# Patient Record
Sex: MALE | Race: WHITE | Employment: OTHER | URBAN - NONMETROPOLITAN AREA
[De-identification: names, ages, dates, MRNs, and addresses within clinical notes are randomized per-mention and may not be internally consistent; named-entity substitution may affect disease eponyms.]

---

## 2020-12-28 DIAGNOSIS — E78.49 OTHER HYPERLIPIDEMIA: ICD-10-CM

## 2020-12-28 DIAGNOSIS — I10 ESSENTIAL HYPERTENSION: Primary | ICD-10-CM

## 2020-12-30 PROBLEM — I10 ESSENTIAL HYPERTENSION: Status: ACTIVE | Noted: 2020-12-30

## 2020-12-30 PROBLEM — E78.5 HYPERLIPIDEMIA: Status: ACTIVE | Noted: 2020-12-30

## 2020-12-30 RX ORDER — LISINOPRIL AND HYDROCHLOROTHIAZIDE 12.5; 2 MG/1; MG/1
TABLET ORAL
Qty: 180 TAB | Refills: 3 | Status: SHIPPED | OUTPATIENT
Start: 2020-12-30 | End: 2022-01-01 | Stop reason: SDUPTHER

## 2020-12-30 RX ORDER — PRAVASTATIN SODIUM 20 MG/1
TABLET ORAL
Qty: 90 TAB | Refills: 3 | Status: SHIPPED | OUTPATIENT
Start: 2020-12-30 | End: 2022-01-01 | Stop reason: SDUPTHER

## 2020-12-30 RX ORDER — AMLODIPINE BESYLATE 10 MG/1
TABLET ORAL
Qty: 90 TAB | Refills: 3 | Status: SHIPPED | OUTPATIENT
Start: 2020-12-30 | End: 2022-01-01 | Stop reason: SDUPTHER

## 2020-12-31 ENCOUNTER — OFFICE VISIT (OUTPATIENT)
Dept: FAMILY MEDICINE CLINIC | Age: 80
End: 2020-12-31
Payer: MEDICARE

## 2020-12-31 ENCOUNTER — HOSPITAL ENCOUNTER (OUTPATIENT)
Dept: LAB | Age: 80
Discharge: HOME | End: 2020-12-31
Payer: MEDICARE

## 2020-12-31 VITALS
DIASTOLIC BLOOD PRESSURE: 67 MMHG | SYSTOLIC BLOOD PRESSURE: 146 MMHG | HEIGHT: 67 IN | WEIGHT: 188 LBS | TEMPERATURE: 97.6 F | HEART RATE: 76 BPM | BODY MASS INDEX: 29.51 KG/M2 | OXYGEN SATURATION: 97 %

## 2020-12-31 DIAGNOSIS — I73.9 PERIPHERAL VASCULAR DISEASE (HCC): ICD-10-CM

## 2020-12-31 DIAGNOSIS — N40.1 BENIGN PROSTATIC HYPERPLASIA WITH LOWER URINARY TRACT SYMPTOMS, SYMPTOM DETAILS UNSPECIFIED: ICD-10-CM

## 2020-12-31 DIAGNOSIS — E78.49 OTHER HYPERLIPIDEMIA: ICD-10-CM

## 2020-12-31 DIAGNOSIS — Z12.11 ENCOUNTER FOR SCREENING COLONOSCOPY: ICD-10-CM

## 2020-12-31 DIAGNOSIS — R26.9 ABNORMAL GAIT: ICD-10-CM

## 2020-12-31 DIAGNOSIS — I10 ESSENTIAL HYPERTENSION: Primary | ICD-10-CM

## 2020-12-31 LAB
ALBUMIN SERPL-MCNC: 4.4 G/DL (ref 3.5–4.7)
ALBUMIN/GLOB SERPL: 1.5 {RATIO}
ALP SERPL-CCNC: 81 U/L (ref 38–126)
ALT SERPL-CCNC: 38 U/L (ref 3–72)
ANION GAP SERPL CALC-SCNC: 10 MMOL/L
AST SERPL W P-5'-P-CCNC: 29 U/L (ref 17–74)
BILIRUB SERPL-MCNC: 0.6 MG/DL (ref 0.2–1)
BUN SERPL-MCNC: 21 MG/DL (ref 9–21)
BUN/CREAT SERPL: 26
CA-I BLD-MCNC: 9.3 MG/DL (ref 8.5–10.5)
CHLORIDE SERPL-SCNC: 101 MMOL/L (ref 94–111)
CO2 SERPL-SCNC: 28 MMOL/L (ref 21–33)
CREAT SERPL-MCNC: 0.8 MG/DL (ref 0.8–1.5)
GLOBULIN SER CALC-MCNC: 3 G/DL
GLUCOSE SERPL-MCNC: 114 MG/DL (ref 70–110)
POTASSIUM SERPL-SCNC: 3.7 MMOL/L (ref 3.2–5.1)
PROT SERPL-MCNC: 7.4 G/DL (ref 6.1–8.4)
SODIUM SERPL-SCNC: 139 MMOL/L (ref 135–145)

## 2020-12-31 PROCEDURE — 80061 LIPID PANEL: CPT

## 2020-12-31 PROCEDURE — 80053 COMPREHEN METABOLIC PANEL: CPT

## 2020-12-31 PROCEDURE — 99214 OFFICE O/P EST MOD 30 MIN: CPT | Performed by: FAMILY MEDICINE

## 2020-12-31 PROCEDURE — 36415 COLL VENOUS BLD VENIPUNCTURE: CPT

## 2020-12-31 PROCEDURE — 84153 ASSAY OF PSA TOTAL: CPT

## 2020-12-31 NOTE — PROGRESS NOTES
Subjective:   Marisel Marr is a 79 y.o. male who was seen for Medication Evaluation    HPI patient is an 79-year-old male have not seen in over a year. He needs medication refills he lives with his wife. He has some gait issues and her memory is that we referred him for evaluation he has had a procedure to the artery in his thigh the vascular surgeon has evaluated carotids as well. The procedure to his thigh was a year ago he is going to set up a follow-up he has not had any discomfort he is not a smoker at this point. He has had no falls or injuries no rashes no syncope or loss of consciousness. He has not been doing as much exercise he says his gait is a little abnormal and we actually send him to physical therapy for evaluation and they told him he was fine no chest pain no shortness of breath he has hypertension hyperlipidemia. He had a colonoscopy 2020 and was told it was normal and he does not need any further. Bowel movements have been a little loose but not severely. No chest congestion or cough. He and his wife are finishing a masters program for their granddaughter who has been living with them for years. There is a little anxiety associated with that finance. No chest pain or shortness of breath. No rash no syncope or loss of consciousness. Home Medications    Medication Sig Start Date End Date Taking?  Authorizing Provider   lisinopril-hydroCHLOROthiazide (PRINZIDE, ZESTORETIC) 20-12.5 mg per tablet TAKE 2 TABLETS DAILY 20  Yes Moraima Sancehz MD   pravastatin (PRAVACHOL) 20 mg tablet TAKE 1 TABLET DAILY 20  Yes Moraima Sanchez MD   amLODIPine (NORVASC) 10 mg tablet TAKE 1 TABLET DAILY 20  Yes Moraima Sanchez MD      Allergies   Allergen Reactions    Pollen Extracts Runny Nose     Social History     Tobacco Use    Smoking status: Former Smoker     Packs/day: 1.00     Years: 2.00     Pack years: 2.00     Quit date: 1960     Years since quittin.0  Smokeless tobacco: Never Used   Substance Use Topics    Alcohol use: Not on file    Drug use: Never            Review of Systems   Constitutional: Negative. HENT: Negative. Eyes: Negative. Respiratory: Negative. Cardiovascular: Negative. Endocrine: Negative. Genitourinary: Negative. Musculoskeletal: Negative. Neurological:        Questionable gait abnormality   Hematological: Negative. Psychiatric/Behavioral: Negative. Physical Exam   Objective:     Visit Vitals  BP (!) 146/67 (BP 1 Location: Left arm, BP Patient Position: Sitting)   Pulse 76   Temp 97.6 °F (36.4 °C)   Ht 5' 7\" (1.702 m)   Wt 188 lb (85.3 kg)   SpO2 97%   BMI 29.44 kg/m²      General: alert, cooperative, no distress   Mental  status: normal mood, behavior, speech, dress, motor activity, and thought processes, able to follow commands   HENT: NCAT   Neck: no visualized mass   Resp: no respiratory distress   Neuro: no gross deficits   Skin: no discoloration or lesions of concern on visible areas   Psychiatric: normal affect, consistent with stated mood, no evidence of hallucinations   The repeat blood pressure is 130/80 pulse is 84 respirations 16 he is pleasant and cooperative. The lungs are clear the abdomen is soft the extremities are clear no edema his pulses seem to be appropriate. His prostate was examined it is significantly enlarged. Nodules were appreciated  Assessment & Plan:     Severe prostate enlargement screening colonoscopy up-to-date peripheral vascular disease will need follow-up hyperlipidemia and hypertension abnormal gait going to get a complete medical panel. I am going to get a lipid panel as well.   I am going to check his PSA with the size of his prostate we are going to get another CAT scan of his head due to gait issues which he is insistent is worsening we may need neurological evaluation but physical therapy said his gait is normal he will need to follow-up for peripheral vascular issues. He seems to be medically stable at this time. We will follow him back in 6 months or sooner if needed this was a 45-minute evaluation greater than half the time was spent in consultation        35 20 43    Additional exam findings: We discussed the expected course, resolution and complications of the diagnosis(es) in detail. Medication risks, benefits, costs, interactions, and alternatives were discussed as indicated. I advised him to contact the office if his condition worsens, changes or fails to improve as anticipated. He expressed understanding with the diagnosis(es) and plan.

## 2020-12-31 NOTE — PROGRESS NOTES
Geremias Abernathy presents today for   Chief Complaint   Patient presents with    Medication Evaluation       Is someone accompanying this pt? no    Is the patient using any DME equipment during OV? no    Depression Screening:  3 most recent PHQ Screens 12/31/2020   PHQ Not Done Medical Reason (indicate in comments)   Little interest or pleasure in doing things Not at all   Feeling down, depressed, irritable, or hopeless Not at all   Total Score PHQ 2 0       Learning Assessment:  No flowsheet data found. Abuse Screening:  No flowsheet data found. Fall Risk  Fall Risk Assessment, last 12 mths 12/31/2020   Able to walk? Yes   Fall in past 12 months? 0   Do you feel unsteady? 1   Are you worried about falling 1   Is the gait abnormal? 1   Number of falls in past 12 months 0       ADL  No flowsheet data found. Health Maintenance reviewed and discussed and ordered per Provider. Health Maintenance Due   Topic Date Due    Lipid Screen  03/02/1950    DTaP/Tdap/Td series (1 - Tdap) 03/02/1960    Shingrix Vaccine Age 50> (1 of 2) 03/02/1989    GLAUCOMA SCREENING Q2Y  03/02/2004    Pneumococcal 65+ years (1 of 1 - PPSV23) 03/02/2004    Flu Vaccine (1) 09/01/2020   . Coordination of Care:  1. Have you been to the ER, urgent care clinic since your last visit? Hospitalized since your last visit? no    2. Have you seen or consulted any other health care providers outside of the [address] [place] [address] since your last visit? Include any pap smears or colon screening.  no    Providers orders his own labs, orders for colonoscopy, mammograms and referrals as needed    Last UDS Checked no  Last Pain contract signed: no

## 2021-01-01 LAB
CHOLEST SERPL-MCNC: 202 MG/DL
HDLC SERPL-MCNC: 44 MG/DL
HDLC SERPL: 4.6 {RATIO} (ref 0–5)
LDLC SERPL CALC-MCNC: 87.2 MG/DL (ref 0–100)
LIPID PROFILE,FLP: ABNORMAL
PSA SERPL-MCNC: 0.4 NG/ML (ref 0.01–4)
TRIGL SERPL-MCNC: 354 MG/DL (ref ?–150)
VLDLC SERPL CALC-MCNC: 70.8 MG/DL

## 2021-01-03 ENCOUNTER — TELEPHONE (OUTPATIENT)
Dept: FAMILY MEDICINE CLINIC | Age: 81
End: 2021-01-03

## 2021-01-03 NOTE — TELEPHONE ENCOUNTER
About his lab work I talked with his wife Tran normal triglycerides slightly elevated lipids otherwise normal kidney function renal function normal no change in therapy

## 2021-01-05 ENCOUNTER — HOSPITAL ENCOUNTER (OUTPATIENT)
Dept: CT IMAGING | Age: 81
Discharge: HOME | End: 2021-01-05
Attending: FAMILY MEDICINE
Payer: MEDICARE

## 2021-01-05 DIAGNOSIS — R26.9 ABNORMAL GAIT: ICD-10-CM

## 2021-01-05 PROCEDURE — 70450 CT HEAD/BRAIN W/O DYE: CPT

## 2021-01-08 ENCOUNTER — TELEPHONE (OUTPATIENT)
Dept: FAMILY MEDICINE CLINIC | Age: 81
End: 2021-01-08

## 2022-01-01 ENCOUNTER — OFFICE VISIT (OUTPATIENT)
Dept: FAMILY MEDICINE CLINIC | Age: 82
End: 2022-01-01
Payer: MEDICARE

## 2022-01-01 ENCOUNTER — APPOINTMENT (OUTPATIENT)
Dept: CT IMAGING | Age: 82
DRG: 871 | End: 2022-01-01
Attending: EMERGENCY MEDICINE
Payer: MEDICARE

## 2022-01-01 ENCOUNTER — APPOINTMENT (OUTPATIENT)
Dept: GENERAL RADIOLOGY | Age: 82
End: 2022-01-01
Attending: EMERGENCY MEDICINE
Payer: MEDICARE

## 2022-01-01 ENCOUNTER — APPOINTMENT (OUTPATIENT)
Dept: GENERAL RADIOLOGY | Age: 82
DRG: 871 | End: 2022-01-01
Attending: EMERGENCY MEDICINE
Payer: MEDICARE

## 2022-01-01 ENCOUNTER — APPOINTMENT (OUTPATIENT)
Dept: CT IMAGING | Age: 82
End: 2022-01-01
Attending: EMERGENCY MEDICINE
Payer: MEDICARE

## 2022-01-01 ENCOUNTER — APPOINTMENT (OUTPATIENT)
Dept: GENERAL RADIOLOGY | Age: 82
DRG: 871 | End: 2022-01-01
Attending: STUDENT IN AN ORGANIZED HEALTH CARE EDUCATION/TRAINING PROGRAM
Payer: MEDICARE

## 2022-01-01 ENCOUNTER — HOSPITAL ENCOUNTER (INPATIENT)
Age: 82
LOS: 2 days | DRG: 871 | End: 2022-09-02
Attending: EMERGENCY MEDICINE | Admitting: STUDENT IN AN ORGANIZED HEALTH CARE EDUCATION/TRAINING PROGRAM
Payer: MEDICARE

## 2022-01-01 ENCOUNTER — HOSPITAL ENCOUNTER (EMERGENCY)
Age: 82
Discharge: ANOTHER HOSPITAL | End: 2022-08-09
Attending: EMERGENCY MEDICINE
Payer: MEDICARE

## 2022-01-01 ENCOUNTER — PATIENT OUTREACH (OUTPATIENT)
Dept: CASE MANAGEMENT | Age: 82
End: 2022-01-01

## 2022-01-01 ENCOUNTER — HOSPITAL ENCOUNTER (OUTPATIENT)
Dept: LAB | Age: 82
Discharge: HOME | End: 2022-01-17
Payer: MEDICARE

## 2022-01-01 ENCOUNTER — APPOINTMENT (OUTPATIENT)
Dept: NON INVASIVE DIAGNOSTICS | Age: 82
DRG: 871 | End: 2022-01-01
Attending: STUDENT IN AN ORGANIZED HEALTH CARE EDUCATION/TRAINING PROGRAM
Payer: MEDICARE

## 2022-01-01 VITALS
TEMPERATURE: 99.4 F | HEIGHT: 67 IN | SYSTOLIC BLOOD PRESSURE: 172 MMHG | OXYGEN SATURATION: 95 % | DIASTOLIC BLOOD PRESSURE: 55 MMHG | BODY MASS INDEX: 26.79 KG/M2 | RESPIRATION RATE: 26 BRPM | HEART RATE: 126 BPM | WEIGHT: 170.7 LBS

## 2022-01-01 VITALS
HEART RATE: 79 BPM | OXYGEN SATURATION: 98 % | BODY MASS INDEX: 29.35 KG/M2 | TEMPERATURE: 98.4 F | WEIGHT: 187 LBS | SYSTOLIC BLOOD PRESSURE: 161 MMHG | DIASTOLIC BLOOD PRESSURE: 54 MMHG | HEIGHT: 67 IN

## 2022-01-01 VITALS
DIASTOLIC BLOOD PRESSURE: 89 MMHG | OXYGEN SATURATION: 100 % | TEMPERATURE: 98.4 F | HEART RATE: 92 BPM | WEIGHT: 185.45 LBS | BODY MASS INDEX: 29.11 KG/M2 | SYSTOLIC BLOOD PRESSURE: 149 MMHG | RESPIRATION RATE: 21 BRPM | HEIGHT: 67 IN

## 2022-01-01 DIAGNOSIS — R41.82 ALTERED MENTAL STATUS, UNSPECIFIED ALTERED MENTAL STATUS TYPE: Primary | ICD-10-CM

## 2022-01-01 DIAGNOSIS — Z13.31 SCREENING FOR DEPRESSION: ICD-10-CM

## 2022-01-01 DIAGNOSIS — N39.0 URINARY TRACT INFECTION WITHOUT HEMATURIA, SITE UNSPECIFIED: ICD-10-CM

## 2022-01-01 DIAGNOSIS — Z13.39 SCREENING FOR ALCOHOLISM: ICD-10-CM

## 2022-01-01 DIAGNOSIS — Z00.00 MEDICARE ANNUAL WELLNESS VISIT, SUBSEQUENT: ICD-10-CM

## 2022-01-01 DIAGNOSIS — R47.01 APHASIA: Primary | ICD-10-CM

## 2022-01-01 DIAGNOSIS — I10 ESSENTIAL HYPERTENSION: ICD-10-CM

## 2022-01-01 DIAGNOSIS — E78.49 OTHER HYPERLIPIDEMIA: ICD-10-CM

## 2022-01-01 DIAGNOSIS — G31.9 CEREBRAL ATROPHY (HCC): Primary | ICD-10-CM

## 2022-01-01 DIAGNOSIS — R26.9 ABNORMAL GAIT: ICD-10-CM

## 2022-01-01 DIAGNOSIS — N17.9 AKI (ACUTE KIDNEY INJURY) (HCC): ICD-10-CM

## 2022-01-01 DIAGNOSIS — U07.1 COVID: ICD-10-CM

## 2022-01-01 DIAGNOSIS — G40.409 GENERALIZED TONIC-CLONIC SEIZURE (HCC): ICD-10-CM

## 2022-01-01 DIAGNOSIS — R26.0 ATAXIC GAIT: ICD-10-CM

## 2022-01-01 DIAGNOSIS — I73.9 PERIPHERAL VASCULAR DISEASE (HCC): ICD-10-CM

## 2022-01-01 LAB
ACC. NO. FROM MICRO ORDER, ACCP: ABNORMAL
ACINETOBACTER CALCOACETICUS-BAUMANII COMPLEX, ACBCX: NOT DETECTED
ALBUMIN SERPL-MCNC: 2.1 G/DL (ref 3.4–5)
ALBUMIN SERPL-MCNC: 2.5 G/DL (ref 3.4–5)
ALBUMIN SERPL-MCNC: 2.7 G/DL (ref 3.4–5)
ALBUMIN SERPL-MCNC: 3.4 G/DL (ref 3.4–5)
ALBUMIN SERPL-MCNC: 4.3 G/DL (ref 3.4–5)
ALBUMIN SERPL-MCNC: 4.3 G/DL (ref 3.5–4.7)
ALBUMIN/GLOB SERPL: 0.7 {RATIO} (ref 0.8–1.7)
ALBUMIN/GLOB SERPL: 0.8 {RATIO} (ref 0.8–1.7)
ALBUMIN/GLOB SERPL: 0.8 {RATIO} (ref 0.8–1.7)
ALBUMIN/GLOB SERPL: 0.9 {RATIO} (ref 0.8–1.7)
ALBUMIN/GLOB SERPL: 1.2 {RATIO} (ref 0.8–1.7)
ALBUMIN/GLOB SERPL: 1.5 {RATIO}
ALP SERPL-CCNC: 59 U/L (ref 45–117)
ALP SERPL-CCNC: 63 U/L (ref 45–117)
ALP SERPL-CCNC: 69 U/L (ref 38–126)
ALP SERPL-CCNC: 70 U/L (ref 45–117)
ALP SERPL-CCNC: 89 U/L (ref 45–117)
ALP SERPL-CCNC: 92 U/L (ref 45–117)
ALT SERPL-CCNC: 33 U/L (ref 3–72)
ALT SERPL-CCNC: 38 U/L (ref 16–61)
ALT SERPL-CCNC: 52 U/L (ref 16–61)
ALT SERPL-CCNC: 55 U/L (ref 16–61)
ALT SERPL-CCNC: 58 U/L (ref 16–61)
ALT SERPL-CCNC: 62 U/L (ref 16–61)
AMMONIA PLAS-SCNC: 25 UMOL/L (ref 11–32)
AMPHET UR QL SCN: NEGATIVE
ANION GAP SERPL CALC-SCNC: 10 MMOL/L (ref 3–18)
ANION GAP SERPL CALC-SCNC: 11 MMOL/L
ANION GAP SERPL CALC-SCNC: 14 MMOL/L (ref 3–18)
ANION GAP SERPL CALC-SCNC: 6 MMOL/L (ref 3–18)
ANION GAP SERPL CALC-SCNC: 6 MMOL/L (ref 3–18)
ANION GAP SERPL CALC-SCNC: 9 MMOL/L (ref 3–18)
APPEARANCE UR: CLEAR
APPEARANCE UR: CLEAR
APTT PPP: 106.6 SEC (ref 23–36.4)
APTT PPP: 167.2 SEC (ref 23–36.4)
APTT PPP: 26 SEC (ref 23–36.4)
APTT PPP: 30.5 SEC (ref 23–36.4)
APTT PPP: 70.4 SEC (ref 23–36.4)
APTT PPP: 81.1 SEC (ref 23–36.4)
ARTERIAL PATENCY WRIST A: YES
AST SERPL W P-5'-P-CCNC: 27 U/L (ref 10–38)
AST SERPL W P-5'-P-CCNC: 31 U/L (ref 17–74)
AST SERPL W P-5'-P-CCNC: 58 U/L (ref 10–38)
AST SERPL W P-5'-P-CCNC: 66 U/L (ref 10–38)
AST SERPL W P-5'-P-CCNC: 77 U/L (ref 10–38)
AST SERPL W P-5'-P-CCNC: 91 U/L (ref 10–38)
ATRIAL RATE: 183 BPM
ATRIAL RATE: 96 BPM
BACTERIA SPEC CULT: NORMAL
BACTERIA URNS QL MICRO: ABNORMAL /HPF
BACTERIA URNS QL MICRO: ABNORMAL /HPF
BACTEROIDES FRAGILIS, BFRA: NOT DETECTED
BARBITURATES UR QL SCN: NEGATIVE
BASE DEFICIT BLDA-SCNC: 5.1 MMOL/L
BASOPHILS # BLD: 0 K/UL (ref 0–0.1)
BASOPHILS # BLD: 0.1 K/UL (ref 0–0.1)
BASOPHILS NFR BLD: 0 % (ref 0–2)
BASOPHILS NFR BLD: 1 % (ref 0–2)
BASOPHILS NFR BLD: 1 % (ref 0–2)
BDY SITE: ABNORMAL
BENZODIAZ UR QL: POSITIVE
BILIRUB SERPL-MCNC: 0.6 MG/DL (ref 0.2–1)
BILIRUB SERPL-MCNC: 0.6 MG/DL (ref 0.2–1)
BILIRUB SERPL-MCNC: 0.7 MG/DL (ref 0.2–1)
BILIRUB SERPL-MCNC: 0.9 MG/DL (ref 0.2–1)
BILIRUB SERPL-MCNC: 0.9 MG/DL (ref 0.2–1)
BILIRUB SERPL-MCNC: 1 MG/DL (ref 0.2–1)
BILIRUB UR QL: NEGATIVE
BILIRUB UR QL: NEGATIVE
BIOFIRE COMMENT, BCIDPF: ABNORMAL
BLASTS NFR BLD MANUAL: 0 %
BNP SERPL-MCNC: 128 PG/ML (ref 0–1800)
BUN SERPL-MCNC: 15 MG/DL (ref 7–18)
BUN SERPL-MCNC: 18 MG/DL (ref 9–21)
BUN SERPL-MCNC: 20 MG/DL (ref 7–18)
BUN SERPL-MCNC: 24 MG/DL (ref 7–18)
BUN SERPL-MCNC: 24 MG/DL (ref 7–18)
BUN SERPL-MCNC: 26 MG/DL (ref 7–18)
BUN/CREAT SERPL: 14 (ref 12–20)
BUN/CREAT SERPL: 16 (ref 12–20)
BUN/CREAT SERPL: 18 (ref 12–20)
BUN/CREAT SERPL: 23
BUN/CREAT SERPL: 30 (ref 12–20)
BUN/CREAT SERPL: 31 (ref 12–20)
C GLABRATA DNA VAG QL NAA+PROBE: NOT DETECTED
CA-I BLD-MCNC: 10 MG/DL (ref 8.5–10.1)
CA-I BLD-MCNC: 7.7 MG/DL (ref 8.5–10.1)
CA-I BLD-MCNC: 7.7 MG/DL (ref 8.5–10.1)
CA-I BLD-MCNC: 8.2 MG/DL (ref 8.5–10.1)
CA-I BLD-MCNC: 8.9 MG/DL (ref 8.5–10.1)
CA-I BLD-MCNC: 9.7 MG/DL (ref 8.5–10.5)
CALCULATED P AXIS, ECG09: -37 DEGREES
CALCULATED P AXIS, ECG09: 5 DEGREES
CALCULATED R AXIS, ECG10: -12 DEGREES
CALCULATED R AXIS, ECG10: 10 DEGREES
CALCULATED R AXIS, ECG10: 6 DEGREES
CALCULATED T AXIS, ECG11: 21 DEGREES
CALCULATED T AXIS, ECG11: 28 DEGREES
CALCULATED T AXIS, ECG11: 41 DEGREES
CANDIDA ALBICANS: NOT DETECTED
CANDIDA AURIS, CAAU: NOT DETECTED
CANDIDA KRUSEI, CKRP: NOT DETECTED
CANDIDA PARAPSILOSIS, CPAUP: NOT DETECTED
CANDIDA TROPICALIS, CTROP: NOT DETECTED
CANNABINOIDS UR QL SCN: NEGATIVE
CHLORIDE SERPL-SCNC: 100 MMOL/L (ref 94–111)
CHLORIDE SERPL-SCNC: 104 MMOL/L (ref 100–111)
CHLORIDE SERPL-SCNC: 110 MMOL/L (ref 100–111)
CHLORIDE SERPL-SCNC: 117 MMOL/L (ref 100–111)
CHLORIDE SERPL-SCNC: 122 MMOL/L (ref 100–111)
CHLORIDE SERPL-SCNC: 99 MMOL/L (ref 100–111)
CHOLEST SERPL-MCNC: 189 MG/DL
CO2 SERPL-SCNC: 22 MMOL/L (ref 21–32)
CO2 SERPL-SCNC: 23 MMOL/L (ref 21–32)
CO2 SERPL-SCNC: 23 MMOL/L (ref 21–32)
CO2 SERPL-SCNC: 24 MMOL/L (ref 21–32)
CO2 SERPL-SCNC: 26 MMOL/L (ref 21–32)
CO2 SERPL-SCNC: 26 MMOL/L (ref 21–33)
COCAINE UR QL SCN: NEGATIVE
COHGB MFR BLD: 0.3 % (ref 1–2)
COLOR UR: ABNORMAL
COLOR UR: YELLOW
COVID-19 RAPID TEST, COVR: DETECTED
CREAT SERPL-MCNC: 0.77 MG/DL (ref 0.6–1.3)
CREAT SERPL-MCNC: 0.8 MG/DL (ref 0.6–1.3)
CREAT SERPL-MCNC: 0.8 MG/DL (ref 0.8–1.5)
CREAT SERPL-MCNC: 0.93 MG/DL (ref 0.6–1.3)
CREAT SERPL-MCNC: 1.41 MG/DL (ref 0.6–1.3)
CREAT SERPL-MCNC: 1.42 MG/DL (ref 0.6–1.3)
CRYPTO NEOFORMANS/GATTII, CRYNEG: NOT DETECTED
DIAGNOSIS, 93000: NORMAL
DIFFERENTIAL METHOD BLD: ABNORMAL
DIFFERENTIAL METHOD BLD: NORMAL
DRUG SCRN COMMENT,DRGCM: ABNORMAL
ECHO AO ASC DIAM: 3.3 CM
ECHO AO ASCENDING AORTA INDEX: 1.79 CM/M2
ECHO AO ROOT DIAM: 3.5 CM
ECHO AO ROOT INDEX: 1.9 CM/M2
ECHO LA AREA 4C: 21.3 CM2
ECHO LA DIAMETER INDEX: 2.12 CM/M2
ECHO LA DIAMETER: 3.9 CM
ECHO LA MAJOR AXIS: 5.3 CM
ECHO LA TO AORTIC ROOT RATIO: 1.11
ECHO LV FRACTIONAL SHORTENING: 30 % (ref 28–44)
ECHO LV INTERNAL DIMENSION DIASTOLE INDEX: 2.55 CM/M2
ECHO LV INTERNAL DIMENSION DIASTOLIC: 4.7 CM (ref 4.2–5.9)
ECHO LV INTERNAL DIMENSION SYSTOLIC INDEX: 1.79 CM/M2
ECHO LV INTERNAL DIMENSION SYSTOLIC: 3.3 CM
ECHO LV IVSD: 1.3 CM (ref 0.6–1)
ECHO LV MASS 2D: 224.8 G (ref 88–224)
ECHO LV MASS INDEX 2D: 122.2 G/M2 (ref 49–115)
ECHO LV POSTERIOR WALL DIASTOLIC: 1.2 CM (ref 0.6–1)
ECHO LV RELATIVE WALL THICKNESS RATIO: 0.51
ECHO LVOT AREA: 3.8 CM2
ECHO LVOT DIAM: 2.2 CM
ECHO RV BASAL DIMENSION: 3.4 CM
ECHO RV LONGITUDINAL DIMENSION: 5.9 CM
ECHO RV MID DIMENSION: 2.8 CM
ENTEROBACTER CLOACAE COMPLEX, ECCP: NOT DETECTED
ENTEROBACTERALES SP. , ENBLS: NOT DETECTED
ENTEROCOCCUS FAECALIS, ENFA: NOT DETECTED
ENTEROCOCCUS FAECIUM, ENFAM: NOT DETECTED
EOSINOPHIL # BLD: 0 K/UL (ref 0–0.4)
EOSINOPHIL # BLD: 0.1 K/UL (ref 0–0.4)
EOSINOPHIL NFR BLD: 0 % (ref 0–5)
EOSINOPHIL NFR BLD: 1 % (ref 0–5)
EPITH CASTS URNS QL MICRO: ABNORMAL /LPF (ref 0–20)
EPITH CASTS URNS QL MICRO: ABNORMAL /LPF (ref 0–20)
ERYTHROCYTE [DISTWIDTH] IN BLOOD BY AUTOMATED COUNT: 12.2 % (ref 11.6–14.5)
ERYTHROCYTE [DISTWIDTH] IN BLOOD BY AUTOMATED COUNT: 12.5 % (ref 11.6–14.5)
ERYTHROCYTE [DISTWIDTH] IN BLOOD BY AUTOMATED COUNT: 13.5 % (ref 11.6–14.5)
ERYTHROCYTE [DISTWIDTH] IN BLOOD BY AUTOMATED COUNT: 13.6 % (ref 11.6–14.5)
ERYTHROCYTE [DISTWIDTH] IN BLOOD BY AUTOMATED COUNT: 13.9 % (ref 11.6–14.5)
ERYTHROCYTE [DISTWIDTH] IN BLOOD BY AUTOMATED COUNT: 14.1 % (ref 11.6–14.5)
ESCHERICHIA COLI: NOT DETECTED
ETHANOL SERPL-MCNC: <3 MG/DL (ref 0–3)
FENTANYL UR QL SCN: NEGATIVE
FIO2 ON VENT: 100 %
FOLATE SERPL-MCNC: 19.6 NG/ML (ref 3.1–17.5)
GLOBULIN SER CALC-MCNC: 2.8 G/DL
GLOBULIN SER CALC-MCNC: 3 G/DL (ref 2–4)
GLOBULIN SER CALC-MCNC: 3.1 G/DL (ref 2–4)
GLOBULIN SER CALC-MCNC: 3.3 G/DL (ref 2–4)
GLOBULIN SER CALC-MCNC: 3.5 G/DL (ref 2–4)
GLOBULIN SER CALC-MCNC: 3.9 G/DL (ref 2–4)
GLUCOSE SERPL-MCNC: 106 MG/DL (ref 74–99)
GLUCOSE SERPL-MCNC: 108 MG/DL (ref 74–99)
GLUCOSE SERPL-MCNC: 122 MG/DL (ref 70–110)
GLUCOSE SERPL-MCNC: 122 MG/DL (ref 74–99)
GLUCOSE SERPL-MCNC: 133 MG/DL (ref 74–99)
GLUCOSE SERPL-MCNC: 144 MG/DL (ref 74–99)
GLUCOSE UR STRIP.AUTO-MCNC: NEGATIVE MG/DL
GLUCOSE UR STRIP.AUTO-MCNC: NEGATIVE MG/DL
HAEMOPHILUS INFLUENZAE, HMI: NOT DETECTED
HCO3 BLDA-SCNC: 20 MMOL/L (ref 22–26)
HCT VFR BLD AUTO: 29.3 % (ref 36–48)
HCT VFR BLD AUTO: 32.4 % (ref 36–48)
HCT VFR BLD AUTO: 34.6 % (ref 36–48)
HCT VFR BLD AUTO: 38.1 % (ref 36–48)
HCT VFR BLD AUTO: 41.7 % (ref 36–48)
HCT VFR BLD AUTO: 43.3 % (ref 36–48)
HDLC SERPL-MCNC: 41 MG/DL (ref 40–60)
HDLC SERPL: 4.6 {RATIO} (ref 0–5)
HGB BLD-MCNC: 11.2 G/DL (ref 13–16)
HGB BLD-MCNC: 11.9 G/DL (ref 13–16)
HGB BLD-MCNC: 13 G/DL (ref 13–16)
HGB BLD-MCNC: 15 G/DL (ref 13–16)
HGB BLD-MCNC: 15.1 G/DL (ref 13–16)
HGB BLD-MCNC: 9.8 G/DL (ref 13–16)
HGB UR QL STRIP: ABNORMAL
HGB UR QL STRIP: ABNORMAL
IMM GRANULOCYTES # BLD AUTO: 0 K/UL
IMM GRANULOCYTES # BLD AUTO: 0 K/UL (ref 0–0.04)
IMM GRANULOCYTES # BLD AUTO: 0 K/UL (ref 0–0.04)
IMM GRANULOCYTES NFR BLD AUTO: 0 %
IMM GRANULOCYTES NFR BLD AUTO: 0 % (ref 0–0.5)
IMM GRANULOCYTES NFR BLD AUTO: 0 % (ref 0–0.5)
INR PPP: 1 (ref 0.8–1.2)
KETONES UR QL STRIP.AUTO: 15 MG/DL
KETONES UR QL STRIP.AUTO: NEGATIVE MG/DL
KLEBSIELLA AEROGENES, KLAE: NOT DETECTED
KLEBSIELLA OXYTOCA: NOT DETECTED
KLEBSIELLA PNEUMONIAE GROUP, KPPG: NOT DETECTED
LACTATE SERPL-SCNC: 2.1 MMOL/L (ref 0.4–2)
LACTATE SERPL-SCNC: 3 MMOL/L (ref 0.4–2)
LACTATE SERPL-SCNC: 3.7 MMOL/L (ref 0.4–2)
LACTATE SERPL-SCNC: 3.8 MMOL/L (ref 0.4–2)
LDLC SERPL CALC-MCNC: 96.6 MG/DL (ref 0–100)
LEUKOCYTE ESTERASE UR QL STRIP.AUTO: NEGATIVE
LEUKOCYTE ESTERASE UR QL STRIP.AUTO: NEGATIVE
LIPID PROFILE,FLP: ABNORMAL
LISTERIA MONOCYTOGENES, LMONP: NOT DETECTED
LYMPHOCYTES # BLD: 0.1 K/UL (ref 0.9–3.6)
LYMPHOCYTES # BLD: 0.2 K/UL (ref 0.9–3.6)
LYMPHOCYTES # BLD: 0.2 K/UL (ref 0.9–3.6)
LYMPHOCYTES # BLD: 0.6 K/UL (ref 0.9–3.6)
LYMPHOCYTES # BLD: 1.1 K/UL (ref 0.9–3.6)
LYMPHOCYTES # BLD: 1.6 K/UL (ref 0.9–3.6)
LYMPHOCYTES NFR BLD: 1 % (ref 21–52)
LYMPHOCYTES NFR BLD: 11 % (ref 21–52)
LYMPHOCYTES NFR BLD: 2 % (ref 21–52)
LYMPHOCYTES NFR BLD: 25 % (ref 21–52)
LYMPHOCYTES NFR BLD: 27 % (ref 21–52)
LYMPHOCYTES NFR BLD: 3 % (ref 21–52)
MAGNESIUM SERPL-MCNC: 2 MG/DL (ref 1.6–2.6)
MAGNESIUM SERPL-MCNC: 2.1 MG/DL (ref 1.6–2.6)
MANUAL DIFFERENTIAL PERFORMED BLD QL: NORMAL
MCH RBC QN AUTO: 32.1 PG (ref 24–34)
MCH RBC QN AUTO: 32.5 PG (ref 24–34)
MCH RBC QN AUTO: 32.6 PG (ref 24–34)
MCH RBC QN AUTO: 32.7 PG (ref 24–34)
MCH RBC QN AUTO: 32.7 PG (ref 24–34)
MCH RBC QN AUTO: 33.1 PG (ref 24–34)
MCHC RBC AUTO-ENTMCNC: 33.4 G/DL (ref 31–37)
MCHC RBC AUTO-ENTMCNC: 34.1 G/DL (ref 31–37)
MCHC RBC AUTO-ENTMCNC: 34.4 G/DL (ref 31–37)
MCHC RBC AUTO-ENTMCNC: 34.6 G/DL (ref 31–37)
MCHC RBC AUTO-ENTMCNC: 34.6 G/DL (ref 31–37)
MCHC RBC AUTO-ENTMCNC: 36.2 G/DL (ref 31–37)
MCV RBC AUTO: 91.4 FL (ref 78–100)
MCV RBC AUTO: 94.1 FL (ref 78–100)
MCV RBC AUTO: 94.5 FL (ref 78–100)
MCV RBC AUTO: 94.5 FL (ref 78–100)
MCV RBC AUTO: 96 FL (ref 78–100)
MCV RBC AUTO: 96.1 FL (ref 78–100)
MECA/C AND MREJ (METHICILLIN RESISTANT GENE), MECAC: NOT DETECTED
METAMYELOCYTES NFR BLD MANUAL: 0 %
METAMYELOCYTES NFR BLD MANUAL: 3 %
METHADONE UR QL: NEGATIVE
METHGB MFR BLD: 0.2 % (ref 0–1.4)
MIXED CELL CASTS URNS QL MICRO: ABNORMAL /LPF
MONOCYTES # BLD: 0 K/UL (ref 0.05–1.2)
MONOCYTES # BLD: 0.1 K/UL (ref 0.05–1.2)
MONOCYTES # BLD: 0.1 K/UL (ref 0.05–1.2)
MONOCYTES # BLD: 0.2 K/UL (ref 0.05–1.2)
MONOCYTES # BLD: 0.3 K/UL (ref 0.05–1.2)
MONOCYTES # BLD: 0.5 K/UL (ref 0.05–1.2)
MONOCYTES NFR BLD: 0 % (ref 3–10)
MONOCYTES NFR BLD: 1 % (ref 3–10)
MONOCYTES NFR BLD: 1 % (ref 3–10)
MONOCYTES NFR BLD: 12 % (ref 3–10)
MONOCYTES NFR BLD: 3 % (ref 3–10)
MONOCYTES NFR BLD: 6 % (ref 3–10)
MYELOCYTES NFR BLD MANUAL: 0 %
NEISSERIA MENINGITIDIS, NMNI: NOT DETECTED
NEUTS BAND NFR BLD MANUAL: 0 % (ref 0–5)
NEUTS BAND NFR BLD MANUAL: 12 % (ref 0–5)
NEUTS BAND NFR BLD MANUAL: 17 % (ref 0–5)
NEUTS BAND NFR BLD MANUAL: 22 % (ref 0–5)
NEUTS BAND NFR BLD MANUAL: 4 % (ref 0–5)
NEUTS SEG # BLD: 2.8 K/UL (ref 1.8–8)
NEUTS SEG # BLD: 3.8 K/UL (ref 1.8–8)
NEUTS SEG # BLD: 4.8 K/UL (ref 1.8–8)
NEUTS SEG # BLD: 7 K/UL (ref 1.8–8)
NEUTS SEG # BLD: 9.2 K/UL (ref 1.8–8)
NEUTS SEG # BLD: 9.5 K/UL (ref 1.8–8)
NEUTS SEG NFR BLD: 61 % (ref 40–73)
NEUTS SEG NFR BLD: 66 % (ref 40–73)
NEUTS SEG NFR BLD: 72 % (ref 40–73)
NEUTS SEG NFR BLD: 73 % (ref 40–73)
NEUTS SEG NFR BLD: 82 % (ref 40–73)
NEUTS SEG NFR BLD: 92 % (ref 40–73)
NITRITE UR QL STRIP.AUTO: NEGATIVE
NITRITE UR QL STRIP.AUTO: NEGATIVE
NRBC # BLD: 0 K/UL (ref 0–0.01)
NRBC BLD-RTO: 0 PER 100 WBC
OPIATES UR QL: NEGATIVE
OTHER CELLS NFR BLD MANUAL: 1 %
OXYCODONE UR QL SCN: NEGATIVE
OXYHGB MFR BLD: 92.2 % (ref 95–99)
P-R INTERVAL, ECG05: 132 MS
P-R INTERVAL, ECG05: 163 MS
PCO2 BLDA: 39 MMHG (ref 35–45)
PCP UR QL: NEGATIVE
PEEP RESPIRATORY: 5 CM[H2O]
PERFORMED BY, TECHID: ABNORMAL
PH BLDA: 7.34 [PH] (ref 7.35–7.45)
PH UR STRIP: 5.5 [PH] (ref 5–8)
PH UR STRIP: 5.5 [PH] (ref 5–8)
PLATELET # BLD AUTO: 105 K/UL (ref 135–420)
PLATELET # BLD AUTO: 136 K/UL (ref 135–420)
PLATELET # BLD AUTO: 180 K/UL (ref 135–420)
PLATELET # BLD AUTO: 247 K/UL (ref 135–420)
PLATELET # BLD AUTO: 266 K/UL (ref 135–420)
PLATELET # BLD AUTO: 81 K/UL (ref 135–420)
PLATELET COMMENTS,PCOM: ABNORMAL
PMV BLD AUTO: 11 FL (ref 9.2–11.8)
PMV BLD AUTO: 11.4 FL (ref 9.2–11.8)
PMV BLD AUTO: 12.2 FL (ref 9.2–11.8)
PMV BLD AUTO: 13 FL (ref 9.2–11.8)
PMV BLD AUTO: 9.5 FL (ref 9.2–11.8)
PMV BLD AUTO: 9.5 FL (ref 9.2–11.8)
PO2 BLDA: 71 MMHG (ref 80–100)
POTASSIUM SERPL-SCNC: 2.6 MMOL/L (ref 3.5–5.5)
POTASSIUM SERPL-SCNC: 3.1 MMOL/L (ref 3.5–5.5)
POTASSIUM SERPL-SCNC: 3.4 MMOL/L (ref 3.5–5.5)
POTASSIUM SERPL-SCNC: 3.7 MMOL/L (ref 3.5–5.5)
POTASSIUM SERPL-SCNC: 4 MMOL/L (ref 3.2–5.1)
PROCALCITONIN SERPL-MCNC: 3.41 NG/ML
PROMYELOCYTES NFR BLD MANUAL: 0 %
PROPOXYPH UR QL: NEGATIVE
PROT SERPL-MCNC: 5.1 G/DL (ref 6.4–8.2)
PROT SERPL-MCNC: 5.6 G/DL (ref 6.4–8.2)
PROT SERPL-MCNC: 6 G/DL (ref 6.4–8.2)
PROT SERPL-MCNC: 7.1 G/DL (ref 6.1–8.4)
PROT SERPL-MCNC: 7.3 G/DL (ref 6.4–8.2)
PROT SERPL-MCNC: 7.8 G/DL (ref 6.4–8.2)
PROT UR STRIP-MCNC: 30 MG/DL
PROT UR STRIP-MCNC: 300 MG/DL
PROTEUS, PRP: NOT DETECTED
PROTHROMBIN TIME: 13.4 SEC (ref 11.5–15.2)
PSEUDOMONAS AERUGINOSA: NOT DETECTED
Q-T INTERVAL, ECG07: 339 MS
Q-T INTERVAL, ECG07: 356 MS
Q-T INTERVAL, ECG07: 403 MS
QRS DURATION, ECG06: 130 MS
QRS DURATION, ECG06: 134 MS
QRS DURATION, ECG06: 135 MS
QTC CALCULATION (BEZET), ECG08: 438 MS
QTC CALCULATION (BEZET), ECG08: 479 MS
QTC CALCULATION (BEZET), ECG08: 520 MS
RBC # BLD AUTO: 3.05 M/UL (ref 4.35–5.65)
RBC # BLD AUTO: 3.43 M/UL (ref 4.35–5.65)
RBC # BLD AUTO: 3.66 M/UL (ref 4.35–5.65)
RBC # BLD AUTO: 3.97 M/UL (ref 4.35–5.65)
RBC # BLD AUTO: 4.56 M/UL (ref 4.35–5.65)
RBC # BLD AUTO: 4.6 M/UL (ref 4.35–5.65)
RBC #/AREA URNS HPF: ABNORMAL /HPF (ref 0–2)
RBC #/AREA URNS HPF: ABNORMAL /HPF (ref 0–2)
RBC MORPH BLD: ABNORMAL
RBC MORPH BLD: NORMAL
RESISTANT GENE SPACE, REGENE: ABNORMAL
SALMONELLA, SALMO: NOT DETECTED
SAO2 % BLD: 93 % (ref 95–99)
SAO2% DEVICE SAO2% SENSOR NAME: ABNORMAL
SERRATIA MARCESCENS: NOT DETECTED
SODIUM SERPL-SCNC: 135 MMOL/L (ref 136–145)
SODIUM SERPL-SCNC: 137 MMOL/L (ref 135–145)
SODIUM SERPL-SCNC: 141 MMOL/L (ref 136–145)
SODIUM SERPL-SCNC: 143 MMOL/L (ref 136–145)
SODIUM SERPL-SCNC: 146 MMOL/L (ref 136–145)
SODIUM SERPL-SCNC: 150 MMOL/L (ref 136–145)
SP GR UR REFRACTOMETRY: 1.02 (ref 1–1.03)
SP GR UR REFRACTOMETRY: >1.03 (ref 1–1.03)
SPECIAL REQUESTS,SREQ: NORMAL
SPECIAL REQUESTS,SREQ: NORMAL
SPECIMEN SITE: ABNORMAL
STAPH EPIDERMIDIS, STEP: NOT DETECTED
STAPH LUGDUNENSIS, STALUG: NOT DETECTED
STAPHYLOCOCCUS AUREUS: DETECTED
STAPHYLOCOCCUS, STAPP: DETECTED
STENO MALTOPHILIA, STMA: NOT DETECTED
STREPTOCOCCUS , STPSP: NOT DETECTED
STREPTOCOCCUS AGALACTIAE (GROUP B): NOT DETECTED
STREPTOCOCCUS PNEUMONIAE , SPNP: NOT DETECTED
STREPTOCOCCUS PYOGENES (GROUP A), SPYOP: NOT DETECTED
THERAPEUTIC RANGE,PTTT: ABNORMAL SEC (ref 82–109)
THERAPEUTIC RANGE,PTTT: NORMAL SEC (ref 82–109)
THERAPEUTIC RANGE,PTTT: NORMAL SEC (ref 82–109)
TRIGL SERPL-MCNC: 257 MG/DL (ref ?–150)
TROPONIN-HIGH SENSITIVITY: 12 NG/L (ref 0–78)
TROPONIN-HIGH SENSITIVITY: 143 NG/L (ref 0–78)
TROPONIN-HIGH SENSITIVITY: 150 NG/L (ref 0–78)
TROPONIN-HIGH SENSITIVITY: 170 NG/L (ref 0–78)
TROPONIN-HIGH SENSITIVITY: 208 NG/L (ref 0–78)
UROBILINOGEN UR QL STRIP.AUTO: 0.2 EU/DL (ref 0.2–1)
UROBILINOGEN UR QL STRIP.AUTO: 0.2 EU/DL (ref 0.2–1)
VENTRICULAR RATE, ECG03: 100 BPM
VENTRICULAR RATE, ECG03: 100 BPM
VENTRICULAR RATE, ECG03: 109 BPM
VIT B12 SERPL-MCNC: 164 PG/ML (ref 211–911)
VLDLC SERPL CALC-MCNC: 51.4 MG/DL
VT SETTING VENT: 480 ML
WBC # BLD AUTO: 4.5 K/UL (ref 4.6–13.2)
WBC # BLD AUTO: 5.7 K/UL (ref 4.6–13.2)
WBC # BLD AUTO: 5.8 K/UL (ref 4.6–13.2)
WBC # BLD AUTO: 7.4 K/UL (ref 4.6–13.2)
WBC # BLD AUTO: 9.6 K/UL (ref 4.6–13.2)
WBC # BLD AUTO: 9.6 K/UL (ref 4.6–13.2)
WBC URNS QL MICRO: ABNORMAL /HPF (ref 0–4)
WBC URNS QL MICRO: ABNORMAL /HPF (ref 0–4)

## 2022-01-01 PROCEDURE — 84145 PROCALCITONIN (PCT): CPT

## 2022-01-01 PROCEDURE — 81001 URINALYSIS AUTO W/SCOPE: CPT

## 2022-01-01 PROCEDURE — 80053 COMPREHEN METABOLIC PANEL: CPT

## 2022-01-01 PROCEDURE — 84484 ASSAY OF TROPONIN QUANT: CPT

## 2022-01-01 PROCEDURE — 71045 X-RAY EXAM CHEST 1 VIEW: CPT

## 2022-01-01 PROCEDURE — 83735 ASSAY OF MAGNESIUM: CPT

## 2022-01-01 PROCEDURE — 83605 ASSAY OF LACTIC ACID: CPT

## 2022-01-01 PROCEDURE — 96374 THER/PROPH/DIAG INJ IV PUSH: CPT

## 2022-01-01 PROCEDURE — 74011250636 HC RX REV CODE- 250/636: Performed by: STUDENT IN AN ORGANIZED HEALTH CARE EDUCATION/TRAINING PROGRAM

## 2022-01-01 PROCEDURE — 77010033678 HC OXYGEN DAILY

## 2022-01-01 PROCEDURE — 74011000258 HC RX REV CODE- 258: Performed by: STUDENT IN AN ORGANIZED HEALTH CARE EDUCATION/TRAINING PROGRAM

## 2022-01-01 PROCEDURE — 51702 INSERT TEMP BLADDER CATH: CPT

## 2022-01-01 PROCEDURE — 74011250636 HC RX REV CODE- 250/636: Performed by: NURSE PRACTITIONER

## 2022-01-01 PROCEDURE — G8510 SCR DEP NEG, NO PLAN REQD: HCPCS | Performed by: STUDENT IN AN ORGANIZED HEALTH CARE EDUCATION/TRAINING PROGRAM

## 2022-01-01 PROCEDURE — 93005 ELECTROCARDIOGRAM TRACING: CPT

## 2022-01-01 PROCEDURE — 85025 COMPLETE CBC W/AUTO DIFF WBC: CPT

## 2022-01-01 PROCEDURE — 31500 INSERT EMERGENCY AIRWAY: CPT

## 2022-01-01 PROCEDURE — 74011000636 HC RX REV CODE- 636: Performed by: EMERGENCY MEDICINE

## 2022-01-01 PROCEDURE — 74011000250 HC RX REV CODE- 250

## 2022-01-01 PROCEDURE — G8419 CALC BMI OUT NRM PARAM NOF/U: HCPCS | Performed by: STUDENT IN AN ORGANIZED HEALTH CARE EDUCATION/TRAINING PROGRAM

## 2022-01-01 PROCEDURE — 70450 CT HEAD/BRAIN W/O DYE: CPT

## 2022-01-01 PROCEDURE — 87040 BLOOD CULTURE FOR BACTERIA: CPT

## 2022-01-01 PROCEDURE — 74011250636 HC RX REV CODE- 250/636: Performed by: FAMILY MEDICINE

## 2022-01-01 PROCEDURE — 36415 COLL VENOUS BLD VENIPUNCTURE: CPT

## 2022-01-01 PROCEDURE — 82607 VITAMIN B-12: CPT

## 2022-01-01 PROCEDURE — 74011000258 HC RX REV CODE- 258: Performed by: FAMILY MEDICINE

## 2022-01-01 PROCEDURE — 74011000250 HC RX REV CODE- 250: Performed by: STUDENT IN AN ORGANIZED HEALTH CARE EDUCATION/TRAINING PROGRAM

## 2022-01-01 PROCEDURE — 74011250636 HC RX REV CODE- 250/636: Performed by: INTERNAL MEDICINE

## 2022-01-01 PROCEDURE — 83880 ASSAY OF NATRIURETIC PEPTIDE: CPT

## 2022-01-01 PROCEDURE — 74011250636 HC RX REV CODE- 250/636: Performed by: EMERGENCY MEDICINE

## 2022-01-01 PROCEDURE — 65270000029 HC RM PRIVATE

## 2022-01-01 PROCEDURE — 85730 THROMBOPLASTIN TIME PARTIAL: CPT

## 2022-01-01 PROCEDURE — C9113 INJ PANTOPRAZOLE SODIUM, VIA: HCPCS | Performed by: STUDENT IN AN ORGANIZED HEALTH CARE EDUCATION/TRAINING PROGRAM

## 2022-01-01 PROCEDURE — 74011000250 HC RX REV CODE- 250: Performed by: NURSE PRACTITIONER

## 2022-01-01 PROCEDURE — 31720 CLEARANCE OF AIRWAYS: CPT

## 2022-01-01 PROCEDURE — G0439 PPPS, SUBSEQ VISIT: HCPCS | Performed by: STUDENT IN AN ORGANIZED HEALTH CARE EDUCATION/TRAINING PROGRAM

## 2022-01-01 PROCEDURE — 82077 ASSAY SPEC XCP UR&BREATH IA: CPT

## 2022-01-01 PROCEDURE — 99285 EMERGENCY DEPT VISIT HI MDM: CPT

## 2022-01-01 PROCEDURE — 82140 ASSAY OF AMMONIA: CPT

## 2022-01-01 PROCEDURE — G8427 DOCREV CUR MEDS BY ELIG CLIN: HCPCS | Performed by: STUDENT IN AN ORGANIZED HEALTH CARE EDUCATION/TRAINING PROGRAM

## 2022-01-01 PROCEDURE — 70496 CT ANGIOGRAPHY HEAD: CPT

## 2022-01-01 PROCEDURE — 74011250636 HC RX REV CODE- 250/636

## 2022-01-01 PROCEDURE — 82803 BLOOD GASES ANY COMBINATION: CPT

## 2022-01-01 PROCEDURE — 87147 CULTURE TYPE IMMUNOLOGIC: CPT

## 2022-01-01 PROCEDURE — G8536 NO DOC ELDER MAL SCRN: HCPCS | Performed by: STUDENT IN AN ORGANIZED HEALTH CARE EDUCATION/TRAINING PROGRAM

## 2022-01-01 PROCEDURE — 72125 CT NECK SPINE W/O DYE: CPT

## 2022-01-01 PROCEDURE — 74011000258 HC RX REV CODE- 258: Performed by: EMERGENCY MEDICINE

## 2022-01-01 PROCEDURE — 80061 LIPID PANEL: CPT

## 2022-01-01 PROCEDURE — 87077 CULTURE AEROBIC IDENTIFY: CPT

## 2022-01-01 PROCEDURE — 36600 WITHDRAWAL OF ARTERIAL BLOOD: CPT

## 2022-01-01 PROCEDURE — 87150 DNA/RNA AMPLIFIED PROBE: CPT

## 2022-01-01 PROCEDURE — 74011250637 HC RX REV CODE- 250/637: Performed by: NURSE PRACTITIONER

## 2022-01-01 PROCEDURE — 85610 PROTHROMBIN TIME: CPT

## 2022-01-01 PROCEDURE — 96365 THER/PROPH/DIAG IV INF INIT: CPT

## 2022-01-01 PROCEDURE — 99214 OFFICE O/P EST MOD 30 MIN: CPT | Performed by: STUDENT IN AN ORGANIZED HEALTH CARE EDUCATION/TRAINING PROGRAM

## 2022-01-01 PROCEDURE — 87086 URINE CULTURE/COLONY COUNT: CPT

## 2022-01-01 PROCEDURE — 87635 SARS-COV-2 COVID-19 AMP PRB: CPT

## 2022-01-01 PROCEDURE — 94002 VENT MGMT INPAT INIT DAY: CPT

## 2022-01-01 PROCEDURE — 84132 ASSAY OF SERUM POTASSIUM: CPT

## 2022-01-01 PROCEDURE — G8753 SYS BP > OR = 140: HCPCS | Performed by: STUDENT IN AN ORGANIZED HEALTH CARE EDUCATION/TRAINING PROGRAM

## 2022-01-01 PROCEDURE — 80307 DRUG TEST PRSMV CHEM ANLYZR: CPT

## 2022-01-01 PROCEDURE — 85027 COMPLETE CBC AUTOMATED: CPT

## 2022-01-01 PROCEDURE — 1101F PT FALLS ASSESS-DOCD LE1/YR: CPT | Performed by: STUDENT IN AN ORGANIZED HEALTH CARE EDUCATION/TRAINING PROGRAM

## 2022-01-01 PROCEDURE — G8754 DIAS BP LESS 90: HCPCS | Performed by: STUDENT IN AN ORGANIZED HEALTH CARE EDUCATION/TRAINING PROGRAM

## 2022-01-01 RX ORDER — LORAZEPAM 2 MG/ML
0.5 INJECTION INTRAMUSCULAR
Status: DISCONTINUED | OUTPATIENT
Start: 2022-01-01 | End: 2022-01-01

## 2022-01-01 RX ORDER — LISINOPRIL AND HYDROCHLOROTHIAZIDE 12.5; 2 MG/1; MG/1
TABLET ORAL
Qty: 180 TABLET | Refills: 0 | Status: SHIPPED | OUTPATIENT
Start: 2022-01-01 | End: 2022-01-01

## 2022-01-01 RX ORDER — POTASSIUM CHLORIDE 7.45 MG/ML
10 INJECTION INTRAVENOUS
Status: COMPLETED | OUTPATIENT
Start: 2022-01-01 | End: 2022-01-01

## 2022-01-01 RX ORDER — POTASSIUM CHLORIDE 7.45 MG/ML
10 INJECTION INTRAVENOUS
Status: DISPENSED | OUTPATIENT
Start: 2022-01-01 | End: 2022-01-01

## 2022-01-01 RX ORDER — ETOMIDATE 2 MG/ML
20 INJECTION INTRAVENOUS ONCE
Status: COMPLETED | OUTPATIENT
Start: 2022-01-01 | End: 2022-01-01

## 2022-01-01 RX ORDER — AMLODIPINE BESYLATE 10 MG/1
TABLET ORAL
Qty: 90 TABLET | Refills: 0 | Status: SHIPPED | OUTPATIENT
Start: 2022-01-01 | End: 2022-01-01

## 2022-01-01 RX ORDER — MIDAZOLAM IN 0.9 % SOD.CHLORID 1 MG/ML
0-10 PLASTIC BAG, INJECTION (ML) INTRAVENOUS
Status: DISCONTINUED | OUTPATIENT
Start: 2022-01-01 | End: 2022-01-01

## 2022-01-01 RX ORDER — ETOMIDATE 2 MG/ML
INJECTION INTRAVENOUS
Status: COMPLETED
Start: 2022-01-01 | End: 2022-01-01

## 2022-01-01 RX ORDER — LORAZEPAM 2 MG/ML
1 INJECTION INTRAMUSCULAR
Status: DISCONTINUED | OUTPATIENT
Start: 2022-01-01 | End: 2022-01-01

## 2022-01-01 RX ORDER — CEFTRIAXONE 1 G/1
1 INJECTION, POWDER, FOR SOLUTION INTRAMUSCULAR; INTRAVENOUS ONCE
Status: ON HOLD | COMMUNITY
End: 2022-01-01 | Stop reason: ALTCHOICE

## 2022-01-01 RX ORDER — MORPHINE SULFATE 2 MG/ML
1 INJECTION, SOLUTION INTRAMUSCULAR; INTRAVENOUS
Status: DISCONTINUED | OUTPATIENT
Start: 2022-01-01 | End: 2022-01-01

## 2022-01-01 RX ORDER — SODIUM CHLORIDE 9 MG/ML
100 INJECTION, SOLUTION INTRAVENOUS CONTINUOUS
Status: DISCONTINUED | OUTPATIENT
Start: 2022-01-01 | End: 2022-01-01

## 2022-01-01 RX ORDER — MORPHINE SULFATE 2 MG/ML
0.5 INJECTION, SOLUTION INTRAMUSCULAR; INTRAVENOUS
Status: DISCONTINUED | OUTPATIENT
Start: 2022-01-01 | End: 2022-01-01

## 2022-01-01 RX ORDER — PRAVASTATIN SODIUM 20 MG/1
TABLET ORAL
Qty: 90 TABLET | Refills: 0 | Status: SHIPPED | OUTPATIENT
Start: 2022-01-01 | End: 2022-01-01

## 2022-01-01 RX ORDER — CLOPIDOGREL BISULFATE 75 MG/1
75 TABLET ORAL DAILY
Status: DISCONTINUED | OUTPATIENT
Start: 2022-01-01 | End: 2022-01-01

## 2022-01-01 RX ORDER — AMLODIPINE BESYLATE 5 MG/1
10 TABLET ORAL DAILY
Status: DISCONTINUED | OUTPATIENT
Start: 2022-01-01 | End: 2022-01-01

## 2022-01-01 RX ORDER — LORAZEPAM 2 MG/ML
2 INJECTION INTRAMUSCULAR
Status: COMPLETED | OUTPATIENT
Start: 2022-01-01 | End: 2022-01-01

## 2022-01-01 RX ORDER — ROCURONIUM BROMIDE 10 MG/ML
INJECTION, SOLUTION INTRAVENOUS
Status: COMPLETED
Start: 2022-01-01 | End: 2022-01-01

## 2022-01-01 RX ORDER — ROCURONIUM BROMIDE 10 MG/ML
85 INJECTION, SOLUTION INTRAVENOUS
Status: COMPLETED | OUTPATIENT
Start: 2022-01-01 | End: 2022-01-01

## 2022-01-01 RX ORDER — ONDANSETRON 2 MG/ML
4 INJECTION INTRAMUSCULAR; INTRAVENOUS
Status: DISCONTINUED | OUTPATIENT
Start: 2022-01-01 | End: 2022-01-01

## 2022-01-01 RX ORDER — METOPROLOL TARTRATE 5 MG/5ML
5 INJECTION INTRAVENOUS ONCE
Status: COMPLETED | OUTPATIENT
Start: 2022-01-01 | End: 2022-01-01

## 2022-01-01 RX ORDER — MIDAZOLAM IN 0.9 % SOD.CHLORID 1 MG/ML
0-10 PLASTIC BAG, INJECTION (ML) INTRAVENOUS
Status: DISCONTINUED | OUTPATIENT
Start: 2022-01-01 | End: 2022-01-01 | Stop reason: HOSPADM

## 2022-01-01 RX ORDER — SODIUM CHLORIDE 0.9 % (FLUSH) 0.9 %
5-40 SYRINGE (ML) INJECTION AS NEEDED
Status: DISCONTINUED | OUTPATIENT
Start: 2022-01-01 | End: 2022-01-01

## 2022-01-01 RX ORDER — ONDANSETRON 4 MG/1
4 TABLET, ORALLY DISINTEGRATING ORAL
Status: DISCONTINUED | OUTPATIENT
Start: 2022-01-01 | End: 2022-01-01

## 2022-01-01 RX ORDER — LORAZEPAM 2 MG/ML
2 INJECTION INTRAMUSCULAR
Status: DISCONTINUED | OUTPATIENT
Start: 2022-01-01 | End: 2022-09-03 | Stop reason: HOSPADM

## 2022-01-01 RX ORDER — ACETAMINOPHEN 500 MG
1000 TABLET ORAL 2 TIMES DAILY
COMMUNITY

## 2022-01-01 RX ORDER — AMLODIPINE BESYLATE 10 MG/1
TABLET ORAL
Qty: 90 TABLET | Refills: 0 | Status: SHIPPED | OUTPATIENT
Start: 2022-01-01

## 2022-01-01 RX ORDER — METOPROLOL TARTRATE 5 MG/5ML
2.5 INJECTION INTRAVENOUS
Status: DISCONTINUED | OUTPATIENT
Start: 2022-01-01 | End: 2022-01-01

## 2022-01-01 RX ORDER — DEXTROSE MONOHYDRATE 50 MG/ML
75 INJECTION, SOLUTION INTRAVENOUS CONTINUOUS
Status: DISCONTINUED | OUTPATIENT
Start: 2022-01-01 | End: 2022-01-01

## 2022-01-01 RX ORDER — ASCORBIC ACID 500 MG
500 TABLET ORAL 2 TIMES DAILY
COMMUNITY

## 2022-01-01 RX ORDER — GUAIFENESIN 600 MG/1
600 TABLET, EXTENDED RELEASE ORAL 2 TIMES DAILY
COMMUNITY

## 2022-01-01 RX ORDER — AZITHROMYCIN 500 MG/1
500 TABLET, FILM COATED ORAL DAILY
Status: ON HOLD | COMMUNITY
End: 2022-01-01 | Stop reason: ALTCHOICE

## 2022-01-01 RX ORDER — HYDRALAZINE HYDROCHLORIDE 20 MG/ML
20 INJECTION INTRAMUSCULAR; INTRAVENOUS
Status: DISCONTINUED | OUTPATIENT
Start: 2022-01-01 | End: 2022-01-01

## 2022-01-01 RX ORDER — HEPARIN SODIUM 1000 [USP'U]/ML
80 INJECTION, SOLUTION INTRAVENOUS; SUBCUTANEOUS ONCE
Status: COMPLETED | OUTPATIENT
Start: 2022-01-01 | End: 2022-01-01

## 2022-01-01 RX ORDER — LOSARTAN POTASSIUM 25 MG/1
25 TABLET ORAL DAILY
COMMUNITY
End: 2022-01-01

## 2022-01-01 RX ORDER — HEPARIN SODIUM 10000 [USP'U]/100ML
18-36 INJECTION, SOLUTION INTRAVENOUS
Status: DISCONTINUED | OUTPATIENT
Start: 2022-01-01 | End: 2022-01-01

## 2022-01-01 RX ORDER — GUAIFENESIN 100 MG/5ML
81 LIQUID (ML) ORAL DAILY
Status: DISCONTINUED | OUTPATIENT
Start: 2022-01-01 | End: 2022-01-01

## 2022-01-01 RX ORDER — LORAZEPAM 2 MG/ML
2 INJECTION INTRAMUSCULAR AS NEEDED
Status: DISCONTINUED | OUTPATIENT
Start: 2022-01-01 | End: 2022-01-01

## 2022-01-01 RX ORDER — SODIUM CHLORIDE 0.9 % (FLUSH) 0.9 %
5-40 SYRINGE (ML) INJECTION EVERY 8 HOURS
Status: DISCONTINUED | OUTPATIENT
Start: 2022-01-01 | End: 2022-09-03 | Stop reason: HOSPADM

## 2022-01-01 RX ORDER — AZITHROMYCIN 250 MG/1
250 TABLET, FILM COATED ORAL DAILY
Status: ON HOLD | COMMUNITY
End: 2022-01-01 | Stop reason: ALTCHOICE

## 2022-01-01 RX ORDER — MORPHINE SULFATE 4 MG/ML
4 INJECTION, SOLUTION INTRAMUSCULAR; INTRAVENOUS
Status: DISCONTINUED | OUTPATIENT
Start: 2022-01-01 | End: 2022-09-03 | Stop reason: HOSPADM

## 2022-01-01 RX ORDER — MORPHINE SULFATE 2 MG/ML
2 INJECTION, SOLUTION INTRAMUSCULAR; INTRAVENOUS
Status: DISCONTINUED | OUTPATIENT
Start: 2022-01-01 | End: 2022-01-01

## 2022-01-01 RX ORDER — CLOPIDOGREL BISULFATE 75 MG/1
75 TABLET ORAL DAILY
COMMUNITY

## 2022-01-01 RX ORDER — POLYETHYLENE GLYCOL 3350 17 G/17G
17 POWDER, FOR SOLUTION ORAL DAILY PRN
Status: DISCONTINUED | OUTPATIENT
Start: 2022-01-01 | End: 2022-01-01

## 2022-01-01 RX ORDER — LEVETIRACETAM 250 MG/1
1000 TABLET ORAL 2 TIMES DAILY
Status: DISCONTINUED | OUTPATIENT
Start: 2022-01-01 | End: 2022-01-01

## 2022-01-01 RX ORDER — ACETAMINOPHEN 650 MG/1
650 SUPPOSITORY RECTAL
Status: DISCONTINUED | OUTPATIENT
Start: 2022-01-01 | End: 2022-09-03 | Stop reason: HOSPADM

## 2022-01-01 RX ORDER — ACETAMINOPHEN 325 MG/1
650 TABLET ORAL
Status: DISCONTINUED | OUTPATIENT
Start: 2022-01-01 | End: 2022-09-03 | Stop reason: HOSPADM

## 2022-01-01 RX ORDER — LANOLIN ALCOHOL/MO/W.PET/CERES
500 CREAM (GRAM) TOPICAL DAILY
Qty: 90 TABLET | Refills: 1 | Status: SHIPPED | OUTPATIENT
Start: 2022-01-01

## 2022-01-01 RX ORDER — PANTOPRAZOLE SODIUM 40 MG/1
40 TABLET, DELAYED RELEASE ORAL EVERY 12 HOURS
Status: DISCONTINUED | OUTPATIENT
Start: 2022-01-01 | End: 2022-01-01

## 2022-01-01 RX ORDER — ATORVASTATIN CALCIUM 40 MG/1
80 TABLET, FILM COATED ORAL
Status: DISCONTINUED | OUTPATIENT
Start: 2022-01-01 | End: 2022-01-01

## 2022-01-01 RX ORDER — ENOXAPARIN SODIUM 100 MG/ML
40 INJECTION SUBCUTANEOUS DAILY
Status: DISCONTINUED | OUTPATIENT
Start: 2022-01-01 | End: 2022-01-01

## 2022-01-01 RX ADMIN — POTASSIUM CHLORIDE 10 MEQ: 7.46 INJECTION, SOLUTION INTRAVENOUS at 09:01

## 2022-01-01 RX ADMIN — SODIUM CHLORIDE 1000 MG: 9 INJECTION, SOLUTION INTRAVENOUS at 22:14

## 2022-01-01 RX ADMIN — SODIUM CHLORIDE 100 ML/HR: 9 INJECTION, SOLUTION INTRAVENOUS at 17:32

## 2022-01-01 RX ADMIN — POTASSIUM CHLORIDE 10 MEQ: 7.46 INJECTION, SOLUTION INTRAVENOUS at 08:19

## 2022-01-01 RX ADMIN — SODIUM CHLORIDE 40 MG: 9 INJECTION, SOLUTION INTRAMUSCULAR; INTRAVENOUS; SUBCUTANEOUS at 14:29

## 2022-01-01 RX ADMIN — LORAZEPAM 0.5 MG: 2 INJECTION INTRAMUSCULAR; INTRAVENOUS at 02:50

## 2022-01-01 RX ADMIN — POTASSIUM CHLORIDE 10 MEQ: 7.46 INJECTION, SOLUTION INTRAVENOUS at 07:25

## 2022-01-01 RX ADMIN — ROCURONIUM BROMIDE 85 MG: 50 INJECTION, SOLUTION INTRAVENOUS at 08:45

## 2022-01-01 RX ADMIN — SODIUM CHLORIDE 1000 MG: 9 INJECTION, SOLUTION INTRAVENOUS at 10:05

## 2022-01-01 RX ADMIN — LORAZEPAM 2 MG: 2 INJECTION INTRAMUSCULAR; INTRAVENOUS at 08:29

## 2022-01-01 RX ADMIN — SODIUM CHLORIDE, PRESERVATIVE FREE 10 ML: 5 INJECTION INTRAVENOUS at 07:00

## 2022-01-01 RX ADMIN — SODIUM CHLORIDE 100 ML/HR: 9 INJECTION, SOLUTION INTRAVENOUS at 03:25

## 2022-01-01 RX ADMIN — POTASSIUM CHLORIDE 10 MEQ: 7.46 INJECTION, SOLUTION INTRAVENOUS at 11:16

## 2022-01-01 RX ADMIN — LEVETIRACETAM 1500 MG: 100 INJECTION INTRAVENOUS at 08:35

## 2022-01-01 RX ADMIN — MORPHINE SULFATE 4 MG: 4 INJECTION, SOLUTION INTRAMUSCULAR; INTRAVENOUS at 18:08

## 2022-01-01 RX ADMIN — POTASSIUM CHLORIDE 10 MEQ: 7.46 INJECTION, SOLUTION INTRAVENOUS at 11:29

## 2022-01-01 RX ADMIN — ACETAMINOPHEN 650 MG: 650 SUPPOSITORY RECTAL at 19:34

## 2022-01-01 RX ADMIN — ETOMIDATE 20 MG: 2 INJECTION INTRAVENOUS at 08:44

## 2022-01-01 RX ADMIN — Medication 2 MG/HR: at 09:31

## 2022-01-01 RX ADMIN — MORPHINE SULFATE 2 MG: 2 INJECTION, SOLUTION INTRAMUSCULAR; INTRAVENOUS at 14:39

## 2022-01-01 RX ADMIN — SODIUM CHLORIDE, PRESERVATIVE FREE 10 ML: 5 INJECTION INTRAVENOUS at 08:20

## 2022-01-01 RX ADMIN — HEPARIN SODIUM 15 UNITS/KG/HR: 10000 INJECTION, SOLUTION INTRAVENOUS at 13:29

## 2022-01-01 RX ADMIN — SODIUM CHLORIDE 1000 ML: 9 INJECTION, SOLUTION INTRAVENOUS at 06:24

## 2022-01-01 RX ADMIN — SODIUM CHLORIDE 1000 MG: 9 INJECTION, SOLUTION INTRAVENOUS at 22:19

## 2022-01-01 RX ADMIN — MORPHINE SULFATE 4 MG: 4 INJECTION, SOLUTION INTRAMUSCULAR; INTRAVENOUS at 20:17

## 2022-01-01 RX ADMIN — HYDRALAZINE HYDROCHLORIDE 20 MG: 20 INJECTION INTRAMUSCULAR; INTRAVENOUS at 00:15

## 2022-01-01 RX ADMIN — VANCOMYCIN HYDROCHLORIDE 1250 MG: 1.25 INJECTION, POWDER, LYOPHILIZED, FOR SOLUTION INTRAVENOUS at 16:46

## 2022-01-01 RX ADMIN — VANCOMYCIN HYDROCHLORIDE 750 MG: 750 INJECTION, POWDER, LYOPHILIZED, FOR SOLUTION INTRAVENOUS at 02:49

## 2022-01-01 RX ADMIN — ETOMIDATE 20 MG: 2 INJECTION, SOLUTION INTRAVENOUS at 08:44

## 2022-01-01 RX ADMIN — LORAZEPAM 2 MG: 2 INJECTION INTRAMUSCULAR; INTRAVENOUS at 14:57

## 2022-01-01 RX ADMIN — SODIUM CHLORIDE 100 ML/HR: 9 INJECTION, SOLUTION INTRAVENOUS at 17:30

## 2022-01-01 RX ADMIN — SODIUM CHLORIDE 1000 MG: 9 INJECTION, SOLUTION INTRAVENOUS at 09:24

## 2022-01-01 RX ADMIN — ACETAMINOPHEN 650 MG: 650 SUPPOSITORY RECTAL at 03:25

## 2022-01-01 RX ADMIN — POTASSIUM CHLORIDE 10 MEQ: 7.46 INJECTION, SOLUTION INTRAVENOUS at 09:24

## 2022-01-01 RX ADMIN — SODIUM CHLORIDE 1000 MG: 9 INJECTION, SOLUTION INTRAVENOUS at 09:17

## 2022-01-01 RX ADMIN — SODIUM CHLORIDE, PRESERVATIVE FREE 10 ML: 5 INJECTION INTRAVENOUS at 05:11

## 2022-01-01 RX ADMIN — SODIUM CHLORIDE, PRESERVATIVE FREE 10 ML: 5 INJECTION INTRAVENOUS at 21:36

## 2022-01-01 RX ADMIN — SODIUM CHLORIDE, PRESERVATIVE FREE 10 ML: 5 INJECTION INTRAVENOUS at 17:33

## 2022-01-01 RX ADMIN — MORPHINE SULFATE 1 MG: 2 INJECTION, SOLUTION INTRAMUSCULAR; INTRAVENOUS at 13:23

## 2022-01-01 RX ADMIN — SODIUM CHLORIDE 40 MG: 9 INJECTION, SOLUTION INTRAMUSCULAR; INTRAVENOUS; SUBCUTANEOUS at 08:50

## 2022-01-01 RX ADMIN — SODIUM CHLORIDE 100 ML/HR: 9 INJECTION, SOLUTION INTRAVENOUS at 06:02

## 2022-01-01 RX ADMIN — IOPAMIDOL 95 ML: 755 INJECTION, SOLUTION INTRAVENOUS at 09:13

## 2022-01-01 RX ADMIN — POTASSIUM CHLORIDE 10 MEQ: 7.46 INJECTION, SOLUTION INTRAVENOUS at 08:00

## 2022-01-01 RX ADMIN — HYDRALAZINE HYDROCHLORIDE 20 MG: 20 INJECTION INTRAMUSCULAR; INTRAVENOUS at 11:33

## 2022-01-01 RX ADMIN — MORPHINE SULFATE 0.5 MG: 2 INJECTION, SOLUTION INTRAMUSCULAR; INTRAVENOUS at 01:14

## 2022-01-01 RX ADMIN — SODIUM CHLORIDE, PRESERVATIVE FREE 10 ML: 5 INJECTION INTRAVENOUS at 13:24

## 2022-01-01 RX ADMIN — METOPROLOL TARTRATE 2.5 MG: 5 INJECTION INTRAVENOUS at 12:05

## 2022-01-01 RX ADMIN — ROCURONIUM BROMIDE 85 MG: 10 INJECTION, SOLUTION INTRAVENOUS at 08:45

## 2022-01-01 RX ADMIN — SODIUM CHLORIDE, PRESERVATIVE FREE 10 ML: 5 INJECTION INTRAVENOUS at 22:18

## 2022-01-01 RX ADMIN — CEFTRIAXONE 1 G: 1 INJECTION, POWDER, FOR SOLUTION INTRAMUSCULAR; INTRAVENOUS at 22:27

## 2022-01-01 RX ADMIN — SODIUM CHLORIDE 500 ML: 9 INJECTION, SOLUTION INTRAVENOUS at 20:08

## 2022-01-01 RX ADMIN — DEXTROSE MONOHYDRATE 75 ML/HR: 50 INJECTION, SOLUTION INTRAVENOUS at 11:28

## 2022-01-01 RX ADMIN — HEPARIN SODIUM 18 UNITS/KG/HR: 10000 INJECTION, SOLUTION INTRAVENOUS at 10:59

## 2022-01-01 RX ADMIN — LORAZEPAM 0.5 MG: 2 INJECTION INTRAMUSCULAR; INTRAVENOUS at 12:05

## 2022-01-01 RX ADMIN — HEPARIN SODIUM 5810 UNITS: 1000 INJECTION INTRAVENOUS; SUBCUTANEOUS at 10:59

## 2022-01-01 RX ADMIN — SODIUM CHLORIDE 100 ML/HR: 9 INJECTION, SOLUTION INTRAVENOUS at 05:58

## 2022-01-01 RX ADMIN — SODIUM CHLORIDE 40 MG: 9 INJECTION, SOLUTION INTRAMUSCULAR; INTRAVENOUS; SUBCUTANEOUS at 08:20

## 2022-01-01 RX ADMIN — METOPROLOL TARTRATE 5 MG: 5 INJECTION INTRAVENOUS at 05:55

## 2022-01-01 RX ADMIN — LORAZEPAM 0.5 MG: 2 INJECTION INTRAMUSCULAR; INTRAVENOUS at 20:18

## 2022-01-04 NOTE — TELEPHONE ENCOUNTER
Patient has an appointment on 1/10/2022. Fax request from [facility] for Amlodipine, Pravastatin and Lisinpril-HCTZ.

## 2022-01-14 NOTE — PATIENT INSTRUCTIONS
Medicare Wellness Visit, Male    The best way to live healthy is to have a lifestyle where you eat a well-balanced diet, exercise regularly, limit alcohol use, and quit all forms of tobacco/nicotine, if applicable. Regular preventive services are another way to keep healthy. Preventive services (vaccines, screening tests, monitoring & exams) can help personalize your care plan, which helps you manage your own care. Screening tests can find health problems at the earliest stages, when they are easiest to treat. [facility] follows the current, evidence-based guidelines published by the [facility] ([facility]) when recommending preventive services for our patients. Because we follow these guidelines, sometimes recommendations change over time as research supports it. (For example, a prostate screening blood test is no longer routinely recommended for men with no symptoms). Of course, you and your doctor may decide to screen more often for some diseases, based on your risk and co-morbidities (chronic disease you are already diagnosed with). Preventive services for you include:  - Medicare offers their members a free annual wellness visit, which is time for you and your primary care provider to discuss and plan for your preventive service needs. Take advantage of this benefit every year!  -All adults over age 72 should receive the recommended pneumonia vaccines. Current USPSTF guidelines recommend a series of two vaccines for the best pneumonia protection.   -All adults should have a flu vaccine yearly and tetanus vaccine every 10 years.  -All adults age 48 and older should receive the shingles vaccines (series of two vaccines).        -All adults age 38-68 who are overweight should have a diabetes screening test once every three years.   -Other screening tests & preventive services for persons with diabetes include: an eye exam to screen for diabetic retinopathy, a kidney function test, a foot exam, and stricter control over your cholesterol.   -Cardiovascular screening for adults with routine risk involves an electrocardiogram (ECG) at intervals determined by the provider.   -Colorectal cancer screening should be done for adults age 54-65 with no increased risk factors for colorectal cancer. There are a number of acceptable methods of screening for this type of cancer. Each test has its own benefits and drawbacks. Discuss with your provider what is most appropriate for you during your annual wellness visit. The different tests include: colonoscopy (considered the best screening method), a fecal occult blood test, a fecal DNA test, and sigmoidoscopy.  -All adults born between [facility] should be screened once for Hepatitis C.  -An Abdominal Aortic Aneurysm (AAA) Screening is recommended for men age 73-68 who has ever smoked in their lifetime.      Here is a list of your current Health Maintenance items (your personalized list of preventive services) with a due date:  Health Maintenance Due   Topic Date Due    COVID-19 Vaccine (1) Never done    DTaP/Tdap/Td  (1 - Tdap) Never done    Shingles Vaccine (1 of 2) Never done    Pneumococcal Vaccine (1 of 1 - PPSV23) Never done    Yearly Flu Vaccine (1) Never done    Cholesterol Test   12/31/2021

## 2022-01-14 NOTE — PROGRESS NOTES
Daniella Rosales presents today for   Chief Complaint   Patient presents with   [address] [facility] Annual Wellness Visit       Is someone accompanying this pt? No     Is the patient using any DME equipment during OV? No     Depression Screening:  3 most recent PHQ Screens 1/14/2022   PHQ Not Done -   Little interest or pleasure in doing things Not at all   Feeling down, depressed, irritable, or hopeless Not at all   Total Score PHQ 2 0       Learning Assessment:  No flowsheet data found. Fall Risk  Fall Risk Assessment, last 12 mths 1/14/2022   Able to walk? Yes   Fall in past 12 months? 0   Do you feel unsteady? 1   Are you worried about falling 1   Is the gait abnormal? -   Number of falls in past 12 months -       Health Maintenance reviewed and discussed and ordered per Provider. Health Maintenance Due   Topic Date Due    Lipid Screen  12/31/2021   . Coordination of Care:  1. Have you been to the ER, urgent care clinic since your last visit? Hospitalized since your last visit? No     2. Have you seen or consulted any other health care providers outside of the [address] [place] [address] since your last visit? Include any pap smears or colon screening. No             This is the Subsequent Medicare Annual Wellness Exam, performed 12 months or more after the Initial AWV or the last Subsequent AWV    I have reviewed the patient's medical history in detail and updated the computerized patient record. Assessment/Plan   Education and counseling provided:  Are appropriate based on today's review and evaluation    1. Screening for alcoholism  2.  Screening for depression       Depression Risk Factor Screening     3 most recent PHQ Screens 1/14/2022   PHQ Not Done -   Little interest or pleasure in doing things Not at all   Feeling down, depressed, irritable, or hopeless Not at all   Total Score PHQ 2 0       Alcohol & Drug Abuse Risk Screen    Do you average more than 1 drink per night or more than 7 drinks a week: Yes    In the past three months have you have had more than 4 drinks containing alcohol on one occasion: No          Functional Ability and Level of Safety    Hearing: Hearing is good. Activities of Daily Living: The home contains: no safety equipment. Patient does total self care      Ambulation: with no difficulty     Fall Risk:  Fall Risk Assessment, last 12 mths 12/31/2020   Able to walk? Yes   Fall in past 12 months? 0   Do you feel unsteady? 1   Are you worried about falling 1   Is the gait abnormal? 1   Number of falls in past 12 months 0      Abuse Screen:  Patient is not abused       Cognitive Screening    Has your family/caregiver stated any concerns about your memory: yes          Health Maintenance Due     Health Maintenance Due   Topic Date Due    COVID-19 Vaccine (1) Never done    DTaP/Tdap/Td series (1 - Tdap) Never done    Shingrix Vaccine Age 50> (1 of 2) Never done    Pneumococcal 65+ years (1 of 1 - PPSV23) Never done    Flu Vaccine (1) Never done    Lipid Screen  12/31/2021       Patient Care Team   Patient Care Team:  Damian De León MD as PCP - General (Family Medicine)  Damian De León MD as PCP - [facility]    History     Patient Active Problem List   Diagnosis Code    Essential hypertension I10    Hyperlipidemia E78.5    Peripheral vascular disease ([facility] [address] I73.9    Encounter for screening colonoscopy Z12.11    Abnormal gait R26.9    Benign prostatic hyperplasia with lower urinary tract symptoms N40.1     Past Medical History:   Diagnosis Date    Hypercholesterolemia     Hypertension       No past surgical history on file.   Current Outpatient Medications   Medication Sig Dispense Refill    amLODIPine (NORVASC) 10 mg tablet TAKE 1 TABLET DAILY 90 Tablet 0    lisinopril-hydroCHLOROthiazide (PRINZIDE, ZESTORETIC) 20-12.5 mg per tablet TAKE 2 TABLETS DAILY 180 Tablet 0    pravastatin (PRAVACHOL) 20 mg tablet TAKE 1 TABLET DAILY 90 Tablet 0 Allergies   Allergen Reactions    Pollen Extracts Runny Nose       Family History   Problem Relation Age of Onset    Dementia Mother      Social History     Tobacco Use    Smoking status: Former Smoker     Packs/day: 1.00     Years: 2.00     Pack years: 2.00     Quit date: 1960     Years since quittin.0    Smokeless tobacco: Never Used   Substance Use Topics    Alcohol use: Not on file         Gal Hayes

## 2022-01-20 NOTE — PROGRESS NOTES
Subjective:   Job Sorensen is a 79 y.o. male who was seen for [address] and Annual Wellness Visit    Patient here for annual physical. He has had some memory changes and abnormal gait. CT head last year showed cerebral atrophy. Blood pressures are controlled at home. Home Medications    Medication Sig Start Date End Date Taking? Authorizing Provider   amLODIPine (NORVASC) 10 mg tablet TAKE 1 TABLET DAILY 22  Yes Yair Dave MD   lisinopril-hydroCHLOROthiazide [facility]) 20-12.5 mg per tablet TAKE 2 TABLETS DAILY 22  Yes Yair Dave MD   pravastatin (PRAVACHOL) 20 mg tablet TAKE 1 TABLET DAILY 22  Yes Yair Dave MD      Allergies   Allergen Reactions    Pollen Extracts Runny Nose     Social History     Tobacco Use    Smoking status: Former Smoker     Packs/day: 1.00     Years: 2.00     Pack years: 2.00     Quit date: 1960     Years since quittin.0    Smokeless tobacco: Never Used   Substance Use Topics    Alcohol use: Not on file    Drug use: Never            Review of Systems   Neurological: Positive for weakness. All other systems reviewed and are negative.          Objective:     Visit Vitals  BP (!) 161/54 (BP 1 Location: Left upper arm, BP Patient Position: Sitting, BP Cuff Size: Adult)   Pulse 79   Temp 98.4 °F (36.9 °C) (Oral)   Ht 5' 7\" (1.702 m)   Wt 187 lb (84.8 kg)   SpO2 98%   BMI 29.29 kg/m²        General: alert, oriented, not in distress  Head: scalp normal, atraumatic  Eyes: pupils are equal and reactive, full and intact EOM's  Ears: patent ear canal, intact tympanic membrane  Nose: normal turbinates, no congestion or discharge  Lips/Mouth: moist lips and buccal mucosa, non-enlarged tonsils, pink throat  Neck: supple, no JVD, no lymphadenopathy, non-palpable thyroid  Chest/Lungs: clear breath sounds, no wheezing or crackles  Heart: normal rate, regular rhythm, no murmur  Abdomen: soft, non-distended, non-tender, normal bowel sounds, no organomegaly, no masses  Extremities: no focal deformities, no edema  Skin: no active skin lesions    Laboratory/Tests:  No visits with results within 12 Month(s) from this visit. Latest known visit with results is:   Hospital Outpatient Visit on 12/31/2020   Component Date Value Ref Range Status    Sodium 12/31/2020 139  135 - 145 mmol/L Final    Potassium 12/31/2020 3.7  3.2 - 5.1 mmol/L Final    Chloride 12/31/2020 101  94 - 111 mmol/L Final    CO2 12/31/2020 28  21 - 33 mmol/L Final    Anion gap 12/31/2020 10  mmol/L Final    Glucose 12/31/2020 114* 70 - 110 mg/dL Final    BUN 12/31/2020 21  9 - 21 mg/dL Final    Creatinine 12/31/2020 0.80  0.8 - 1.50 mg/dL Final    BUN/Creatinine ratio 12/31/2020 26    Final    GFR est AA 12/31/2020 >60  ml/min/1.73m2 Final    GFR est non-AA 12/31/2020 >60  ml/min/1.73m2 Final    Comment: Estimated GFR is calculated using the IDMS-traceable Modification of Diet in Renal Disease (MDRD) Study equation, reported for both  Americans (GFRAA) and non- Americans (GFRNA), and normalized to 1.73m2 body surface area. The physician must decide which value applies to the patient. The MDRD study equation should only be used in individuals age 25 or older. It has not been validated for the following: pregnant women, patients with serious comorbid conditions, or on certain medications, or persons with extremes of body size, muscle mass, or nutritional status.  Calcium 12/31/2020 9.3  8.5 - 10.5 mg/dL Final    Bilirubin, total 12/31/2020 0.6  0.2 - 1.0 mg/dL Final    AST (SGOT) 12/31/2020 29  17 - 74 U/L Final    ALT (SGPT) 12/31/2020 38  3 - 72 U/L Final    Alk.  phosphatase 12/31/2020 81  38 - 126 U/L Final    Protein, total 12/31/2020 7.4  6.1 - 8.4 g/dL Final    Albumin 12/31/2020 4.4  3.5 - 4.7 g/dL Final    Globulin 12/31/2020 3.0  g/dL Final    A-G Ratio 12/31/2020 1.5    Final    LIPID PROFILE 12/31/2020      Final    Cholesterol, total 12/31/2020 202* <200 mg/dL Final    Triglyceride 12/31/2020 354* <150 mg/dL Final    Comment: Based on NCEP-ATP III:  Triglycerides <150 mg/dL  is considered  normal, 150-199 mg/dL  borderline high,  200-499 mg/dL high and   greater than or equal to 500 mg/dL very high.  HDL Cholesterol 12/31/2020 44  mg/dL Final    Comment: Based on NCEP ATP III, HDL Cholesterol <40 mg/dL is considered low  and >60 mg/dL is elevated.  LDL, calculated 12/31/2020 87.2  0 - 100 mg/dL Final    Comment: Based on the NCEP-ATP: LDL-C concentrations are considered  optimal  <100 mg/dL,  near optimal/above Normal 100-129 mg/dL  Borderline High: 130-159, High: 160-189 mg/dL  Very High: Greater than or equal to 190 mg/dL      VLDL, calculated 12/31/2020 70.8  mg/dL Final    CHOL/HDL Ratio 12/31/2020 4.6  0.0 - 5.0   Final    Prostate Specific Ag 12/31/2020 0.4  0.01 - 4.0 ng/mL Final    Comment: Method used is [employer]  (NOTE)  Many types of test methods are used to measure PSA and can yield   different results on any given specimen. Therefore PSA results from   different laboratories on the same patient are not directly   comparable. In addition, PSA values by themselves should not be   interpreted as the presence or absence of cancer. PSA values used to   monitor for biochemical recurrence of prostate cancer should be   interpreted in accordance with current clinical guidelines (e.g. the   2013 American Urological Association (AUA) guidelines and the 2015    Association of Urology (EAU) guidelines). Assessment & Plan:     1. Screening for alcoholism    - ND ANNUAL ALCOHOL SCREEN 15 MIN    2. Screening for depression    - DEPRESSION SCREEN ANNUAL    3. Cerebral atrophy ([address]  Continue to monitor mental status. B12 supplementation provided   - VITAMIN B12 & FOLATE    4. Peripheral vascular disease (HCC)  Continue pravastatin    5. Essential hypertension  Continue norvasc, prinzide    6.  Medicare annual wellness visit, subsequent  Refer to separate note  - CBC WITH AUTOMATED DIFF  - METABOLIC PANEL, COMPREHENSIVE  - LIPID PANEL    7. Ataxic gait   B12 deficiency present. Will start b12 supplementation.   - VITAMIN B12 & FOLATE             I have discussed the diagnosis with the patient and the intended plan as seen in the above orders. The patient has received an after-visit summary and questions were answered concerning future plans. I have discussed medication side effects and warnings with the patient as well. I have reviewed the plan of care with the patient, accepted their input and they are in agreement with the treatment goals. Previous lab and imaging results were reviewed by me.        Adron Bence, MD  January 20, 2022

## 2022-08-09 PROBLEM — J96.01 ACUTE RESPIRATORY FAILURE WITH HYPOXIA (HCC): Status: ACTIVE | Noted: 2022-01-01

## 2022-08-09 PROBLEM — I63.9 ISCHEMIC CEREBROVASCULAR ACCIDENT (CVA) (HCC): Status: ACTIVE | Noted: 2022-01-01

## 2022-08-09 PROBLEM — J96.90 RESPIRATORY FAILURE (HCC): Status: ACTIVE | Noted: 2022-01-01

## 2022-08-09 PROBLEM — I65.21 RIGHT CAROTID ARTERY OCCLUSION: Status: ACTIVE | Noted: 2022-01-01

## 2022-08-09 PROBLEM — R56.9 SEIZURE (HCC): Status: ACTIVE | Noted: 2022-01-01

## 2022-08-09 PROBLEM — J69.0 ASPIRATION PNEUMONIA (HCC): Status: ACTIVE | Noted: 2022-01-01

## 2022-08-09 NOTE — ED NOTES
VERSED MAR UPDATES    [id number] 2mg/hr  1071 3mg/hr  5214 4mg/hr  1001 5mg/hr  1011 6mg/hr  1021 7mg/hr  1103 8mg/hr  1158 9mg/hr

## 2022-08-09 NOTE — ED NOTES
Patient left via helicopter transport for [facility] to be treated by Dr. Brody Jimenez. Patient is intubated, on vent, sedated with Versed at 9mg/hr to right AC 20g IV. Has right hand 20g and left AC 18g. Skin is intact, vitals are stable, has right clot confirmed by CT. Arrived due to AMS while at home attempting to cut grass at 0745 am and seized at 0823 am after arrival to ED. Received 2mg Ativan, 1500mg Keppra around 0830am.     The wife is present, has belongings, and is aware of transfer. Wife's cell number provided to ANA LILIA Rivera of [facility].

## 2022-08-09 NOTE — ED TRIAGE NOTES
Wife states she went to the [facility] and when she got home, she states her  was not acting like himself and she thinks he has some slurred speech    Upon arrival to ED, pt is not following directions well Pt disoriented, not alert to self or place

## 2022-08-09 NOTE — ED PROVIDER NOTES
The patient is an 65-year-old male with past medical history significant for hyperlipidemia and hypertension, who was last known well at 6:45 AM.  His wife states that she went to the [facility] to work out. He was sitting at the computer and was typing. He was supposed to be mowing the lawn while she was at the [facility]. She came home approximately 25 minutes prior to arrival and found the patient sitting at the computer and unable to answer questions and was very confused. She loaded him in the car and brought him to the ED. With the patient arrived, he he walked back to the room. He was awake and appeared to be able to understand language but was not able to answer questions. Minutes after arrival, the patient began having a generalized tonic-clonic seizure and received 2 mg of Ativan. Past Medical History:   Diagnosis Date    Hypercholesterolemia     Hypertension        No past surgical history on file.       Family History:   Problem Relation Age of Onset    Dementia Mother        Social History     Socioeconomic History    Marital status:      Spouse name: Not on file    Number of children: Not on file    Years of education: Not on file    Highest education level: Not on file   Occupational History    Not on file   Tobacco Use    Smoking status: Former     Packs/day: 1.00     Years: 2.00     Pack years: 2.00     Types: Cigarettes     Quit date: 1960     Years since quittin.6    Smokeless tobacco: Never   Substance and Sexual Activity    Alcohol use: Not on file    Drug use: Never    Sexual activity: Not on file   Other Topics Concern    Not on file   Social History Narrative    Not on file     Social Determinants of Health     Financial Resource Strain: Not on file   Food Insecurity: Not on file   Transportation Needs: Not on file   Physical Activity: Not on file   Stress: Not on file   Social Connections: Not on file   Intimate Partner Violence: Not on file   Housing Stability: Not on file ALLERGIES: Pollen extracts    Review of Systems   Unable to perform ROS: Mental status change     There were no vitals filed for this visit. Physical Exam  Constitutional:       Appearance: He is well-developed. Comments: Initially alert but then had a generalized tonic-clonic seizure and was unresponsive   HENT:      Head: Normocephalic and atraumatic. Eyes:      Extraocular Movements: Extraocular movements intact. Pupils: Pupils are equal, round, and reactive to light. Cardiovascular:      Rate and Rhythm: Normal rate and regular rhythm. Heart sounds: Normal heart sounds. Pulmonary:      Effort: Pulmonary effort is normal.      Breath sounds: Normal breath sounds. Abdominal:      General: Bowel sounds are normal.      Palpations: Abdomen is soft. Musculoskeletal:         General: Normal range of motion. Cervical back: Normal range of motion and neck supple. Skin:     General: Skin is warm and dry. Capillary Refill: Capillary refill takes less than 2 seconds. Neurological:      Comments: Initially GCS 15, and walked back. Had an expressive aphasia. Did not have a receptive aphasia initially. Shortly thereafter, the patient had a generalized tonic-clonic seizure and became unresponsive.    Psychiatric:      Comments: Unable to assess     Recent Results (from the past 12 hour(s))   CBC WITH MANUAL DIFF    Collection Time: 08/09/22  8:23 AM   Result Value Ref Range    WBC 5.8 4.6 - 13.2 K/uL    RBC 4.56 4.35 - 5.65 M/uL    HGB 15.1 13.0 - 16.0 g/dL    HCT 41.7 36.0 - 48.0 %    MCV 91.4 78.0 - 100.0 FL    MCH 33.1 24.0 - 34.0 PG    MCHC 36.2 31.0 - 37.0 g/dL    RDW 12.2 11.6 - 14.5 %    PLATELET 035 753 - 805 K/uL    MPV 9.5 9.2 - 11.8 FL    NRBC 0.0 0.0  WBC    ABSOLUTE NRBC 0.00 0.00 - 0.01 K/uL    NEUTROPHILS PENDING %    LYMPHOCYTES PENDING %    MONOCYTES PENDING %    EOSINOPHILS PENDING %    BASOPHILS PENDING %    IMMATURE GRANULOCYTES PENDING % BAND NEUTROPHILS PENDING %    PROMYELOCYTES PENDING %    MYELOCYTES PENDING %    METAMYELOCYTES PENDING %    BLASTS PENDING %    OTHER CELL PENDING %    ABS. NEUTROPHILS PENDING K/UL    ABS. LYMPHOCYTES PENDING K/UL    ABS. MONOCYTES PENDING K/UL    ABS. EOSINOPHILS PENDING K/UL    ABS. BASOPHILS PENDING K/UL    ABS. IMM. GRANS. PENDING K/UL    RBC COMMENTS PENDING     DF PENDING     DIFFERENTIAL Manual Differenctial Ordered     METABOLIC PANEL, COMPREHENSIVE    Collection Time: 08/09/22  8:23 AM   Result Value Ref Range    Sodium 135 (L) 136 - 145 mmol/L    Potassium 3.7 3.5 - 5.5 mmol/L    Chloride 99 (L) 100 - 111 mmol/L    CO2 26 21 - 32 mmol/L    Anion gap 10 3.0 - 18.0 mmol/L    Glucose 133 (H) 74 - 99 mg/dL    BUN 15 7 - 18 mg/dL    Creatinine 0.93 0.60 - 1.30 mg/dL    BUN/Creatinine ratio 16 12 - 20      GFR est AA >60 >60 ml/min/1.73m2    GFR est non-AA >60 >60 ml/min/1.73m2    Calcium 10.0 8.5 - 10.1 mg/dL    Bilirubin, total 0.6 0.2 - 1.0 mg/dL    AST (SGOT) 27 10 - 38 U/L    ALT (SGPT) 38 16 - 61 U/L    Alk. phosphatase 89 45 - 117 U/L    Protein, total 7.8 6.4 - 8.2 g/dL    Albumin 4.3 3.4 - 5.0 g/dL    Globulin 3.5 2.0 - 4.0 g/dL    A-G Ratio 1.2 0.8 - 1.7     PTT    Collection Time: 08/09/22  8:23 AM   Result Value Ref Range    aPTT 26.0 23.0 - 36.4 sec    aPTT, therapeutic range   82 - 109 sec   PROTHROMBIN TIME + INR    Collection Time: 08/09/22  8:23 AM   Result Value Ref Range    Prothrombin time 13.4 11.5 - 15.2 sec    INR 1.0 0.8 - 1.2     TROPONIN-HIGH SENSITIVITY    Collection Time: 08/09/22  8:23 AM   Result Value Ref Range    Troponin-High Sensitivity 12 0 - 78 ng/L   NT-PRO BNP    Collection Time: 08/09/22  8:23 AM   Result Value Ref Range    NT pro- 0 - 1,800 pg/mL   ETHYL ALCOHOL    Collection Time: 08/09/22  8:23 AM   Result Value Ref Range    ALCOHOL(ETHYL),SERUM <3 0 - 3 mg/dL     XR CHEST PORT   Final Result      ET tube in good position without pneumothorax.      Patchy consolidations left lung base representing infiltrates/atelectasis. CT HEAD WO CONT   Final Result      Diffuse atrophy with evidence of advanced probable chronic microvascular   ischemic changes but stable since the last study dated 1/5/2021. There is no evidence of any hemorrhage, definite acute infarct, or mass effect. Paranasal sinus disease as described worsening since the last study. CTA HEAD NECK W WO CONT    (Results Pending)      CTA: Right internal carotid artery occlusion at the origin and just distal to it. There is an unstable thrombus of an unknown age. MDM  Number of Diagnoses or Management Options  Aphasia  Generalized tonic-clonic seizure ([address]  Diagnosis management comments: The patient is an 65-year-old male with past medical history significant for hypertension and hyperlipidemia, who was brought to the ED today by his wife with a last known well time was 6:45 AM.  When he presented to the ED, he had expressive aphasia but was able to walk and did not have any noticeable motor deficits. However, shortly thereafter he had a generalized tonic-clonic seizure, became unresponsive, hypoxic and then we moved him to the trauma bay to intubate him. The patient was intubated and we gave him 2 mg of IV Ativan along with 1500 mg of IV Keppra. We consulted teleneurology who recommended an MRI as well as continuous EEG monitoring. Once the patient CTA was back, we did consult [facility] neuro interventional who stated that they currently do not have an ICU bed available. I then spoke with Dr. Flor Oconnell from [facility] neuro interventional who stated that they would take the patient. The patient is going to ED to ED transfer. Dr. Jeffrey Ascencio is the ED accepting physician.            Intubation    Date/Time: 8/9/2022 9:12 AM  Performed by: Rebecca Barnes MD  Authorized by: Rebecca Barnes MD     Consent:     Consent obtained:  Emergent situation  Universal protocol: Patient identity confirmed:  Arm band  Pre-procedure details:     Indication: failure to oxygenate, failure to protect airway and predicted clinical deterioration      Patient status:  Altered mental status    Look externally: no concerns      Mouth opening - incisor distance:  3 or more finger widths    Hyoid-mental distance: 3 or more finger widths      Hyoid-thyroid distance: 2 or more finger widths      Mallampati score: I    Obstruction: none      Neck mobility: normal      Pharmacologic strategy: DSI      Induction agents:  Etomidate    Paralytics:  Rocuronium  Procedure details:     Preoxygenation:  Bag valve mask    CPR in progress: no      Intubation method:  Oral    Intubation technique: video assisted      Laryngoscope blade:   Mac 3    Bougie used: no      Grade view: I      Tube size (mm):  7.5    Tube type:  Cuffed    Number of attempts:  1    Tube visualized through cords: yes    Placement assessment:     ETT at teeth/gumline (cm):  24    Tube secured with:  ETT wagner    Breath sounds:  Equal    Placement verification: chest rise, colorimetric ETCO2 and CXR verification      CXR findings:  Appropriate position  Post-procedure details:     Procedure completion:  Tolerated  Critical Care    Date/Time: 8/9/2022 12:57 PM  Performed by: Jerica Narayan MD  Authorized by: Jerica Narayan MD     Critical care provider statement:     Critical care time (minutes):  90    Critical care time was exclusive of:  Separately billable procedures and treating other patients    Critical care was necessary to treat or prevent imminent or life-threatening deterioration of the following conditions:  Cardiac failure, circulatory failure, CNS failure or compromise and respiratory failure    Critical care was time spent personally by me on the following activities:  Blood draw for specimens, development of treatment plan with patient or surrogate, discussions with consultants, evaluation of patient's response to treatment, examination of patient, obtaining history from patient or surrogate, ordering and performing treatments and interventions, ordering and review of laboratory studies, ordering and review of radiographic studies, pulse oximetry, re-evaluation of patient's condition, review of old charts and ventilator management    I assumed direction of critical care for this patient from another provider in my specialty: no      Care discussed with: accepting provider at another facility

## 2022-08-09 NOTE — ED NOTES
Verbal orders for Etomidate 20 mg and Rocuronium 85 mg received from Dr. Eden Pennington at bedside during emergent intubation.

## 2022-08-09 NOTE — PROGRESS NOTES
Pt arrived in ER and having what appeared to be seizures. Pt intubated by ER MD with 7.5 ETT Lot # [id number], and taken to CT. After returning Pt placed on Ventilator at rate of 10 [address] 480, Fi02 100%, PEEP of 5. ABGS and CXR to follow. Breath Sounds equal & clear to percussion & auscultation, no accessory muscle use

## 2022-08-12 PROBLEM — F03.90 DEMENTIA (HCC): Status: ACTIVE | Noted: 2022-01-01

## 2022-08-22 NOTE — PROGRESS NOTES
8/22/2022    2:34 PM  CTN reviewed patient chart and noted that he was admitted to [facility] 8/9/2022 and discharge on 8/20/2022 following carotid stenosis, symptomatic without infarct and respiratory failure. He was then transferred to [facility] 8/20/2022 for continuation of care. Transition of care outreach postponed for 14 days due to patient's discharge to SNF. Will follow.

## 2022-08-30 NOTE — Clinical Note
Status[de-identified] INPATIENT [101]   Type of Bed: Telemetry [19]   Cardiac Monitoring Required?: Yes   Inpatient Hospitalization Certified Necessary for the Following Reasons: 3. Patient receiving treatment that can only be provided in an inpatient setting (further clarification in H&P documentation)   Admitting Diagnosis: Altered mental status [780.97. ICD-9-CM]   Admitting Diagnosis: UTI (urinary tract infection) [244162]   Admitting Diagnosis: COVID [8986705]   Admitting Physician: Zuleyka Ziegler [[id number]]   Attending Physician: Karen Bolaños   Estimated Length of Stay: 2 Midnights   Discharge Plan[de-identified] Extended Care Facility (e.g. Adult Home, Nursing Home, etc.)

## 2022-08-30 NOTE — ED NOTES
Pt extremely fidgety/agitated/confused when nurses are attempting to get vs, etc.  Once pt is left alone and wrapped up in a sheet/blanket, he settles down. Pt will not keep mask or oxygen tubing on, pt will not keep pulse ox on.  sats are 97-99%RA when checked.

## 2022-08-30 NOTE — ED PROVIDER NOTES
EMERGENCY DEPARTMENT HISTORY AND PHYSICAL EXAM      Date: 8/30/2022  Patient Name: Ivone Swenson    History of Presenting Illness     Chief Complaint   Patient presents with    Fall       History Provided By: Patient's Wife and EMS    HPI: Ivone Swenson, 79 y.o. male CVA, Dementia, HTN,SZ Presents with fever, AMS and fall. Has been having fever and increasing lethargy in NH for past two days. Today, he was tested positive for CoVID and sent to the ED. He was found to have fallen in the morning and had a forehead abrasion. He ws placed back in bed and later in the evening, staff could not get a BP nor could they arouse him so he was sent t the ED. He was allegedly alert and able to maintain conversation 1 week ago. Patient cannot give history today due to lethargy. There are no other complaints, changes, or physical findings at this time.     PCP: Pieter Bender MD    Current Facility-Administered Medications   Medication Dose Route Frequency Provider Last Rate Last Admin    sodium chloride (NS) flush 5-40 mL  5-40 mL IntraVENous Q8H Patty Keiko, NP        sodium chloride (NS) flush 5-40 mL  5-40 mL IntraVENous PRN Patty Keiko, NP        acetaminophen (TYLENOL) tablet 650 mg  650 mg Oral Q6H PRN Patty Lank, NP        Or    acetaminophen (TYLENOL) suppository 650 mg  650 mg Rectal Q6H PRN Patty Keiko, NP   650 mg at 08/31/22 0325    polyethylene glycol (MIRALAX) packet 17 g  17 g Oral DAILY PRN Patty Keiko NP        ondansetron (ZOFRAN ODT) tablet 4 mg  4 mg Oral Q8H PRN Patty Keiko, NP        Or    [facility]ron [facility]) injection 4 mg  4 mg IntraVENous Q6H PRN Patty Keiko, NP        enoxaparin (LOVENOX) injection 40 mg  40 mg SubCUTAneous DAILY Patty Keiko, NP        0.9% sodium chloride infusion  100 mL/hr IntraVENous CONTINUOUS Patty Keiko,  mL/hr at 08/31/22 0325 100 mL/hr at 08/31/22 0325    cefTRIAXone (ROCEPHIN) 1 g in 0.9% sodium chloride (MBP/ADV) 50 mL MBP  1 g IntraVENous Q24H Brigido Butler NP        potassium chloride 10 mEq in 100 ml IVPB  10 mEq IntraVENous Q1H Brigido Butler NP           Past History   Past Medical History:  Past Medical History:   Diagnosis Date    Dementia ([facility] [address] 2022    Dyslipidemia 2020    Hypertension     AUDRA symptomatic acute occlusion then severe stenosis with ischemia but no infarct 2022    Seizure ([facility] [address] 2022       Past Surgical History:  Past Surgical History:   Procedure Laterality Date    HX CAROTID STENT Right 08/15/2022    AUDRA TCAR       Family History:  Family History   Problem Relation Age of Onset    Dementia Mother        Social History:  Social History     Tobacco Use    Smoking status: Former     Packs/day: 1.00     Years: 2.00     Pack years: 2.00     Types: Cigarettes     Quit date: 1960     Years since quittin.7    Smokeless tobacco: Never   Substance Use Topics    Drug use: Never       Allergies: Allergies   Allergen Reactions    Pollen Extracts Runny Nose     Review of Systems   Review of Systems   Unable to perform ROS: Mental status change   Constitutional:  Positive for activity change and fever. All other systems reviewed and are negative. Physical Exam   Physical Exam  Vitals and nursing note reviewed. Constitutional:       Appearance: He is ill-appearing. He is not diaphoretic. HENT:      Head:      Comments: Forehead ecchymoses. Cardiovascular:      Rate and Rhythm: Regular rhythm. Tachycardia present. Pulses: Normal pulses. Heart sounds: Normal heart sounds. Pulmonary:      Comments: Reduced breath sounds  Abdominal:      Palpations: Abdomen is soft. Tenderness: There is no abdominal tenderness. Musculoskeletal:         General: Normal range of motion. Cervical back: Normal range of motion and neck supple. Skin:     General: Skin is warm. Neurological:      Comments: Responds to painful stimuli.  By withdrawing       Lab and Diagnostic Study Results   Labs -     Recent Results (from the past 12 hour(s))   URINALYSIS W/ RFLX MICROSCOPIC    Collection Time: 08/30/22  8:25 PM   Result Value Ref Range    Color Dark Yellow      Appearance Clear      Specific gravity 1.022 1.005 - 1.030      pH (UA) 5.5 5.0 - 8.0      Protein 300 (A) Negative mg/dL    Glucose Negative Negative mg/dL    Ketone 15 (A) Negative mg/dL    Bilirubin Negative Negative      Blood Small (A) Negative      Urobilinogen 0.2 0.2 - 1.0 EU/dL    Nitrites Negative Negative      Leukocyte Esterase Negative Negative     URINE MICROSCOPIC    Collection Time: 08/30/22  8:25 PM   Result Value Ref Range    WBC 0-4 0 - 4 /hpf    RBC 0-5 0 - 2 /hpf    Epithelial cells Few 0 - 20 /lpf    Bacteria 2+ (A) None /hpf    Mixed Cell Cast 2-5 /lpf   LACTIC ACID    Collection Time: 08/30/22  8:30 PM   Result Value Ref Range    Lactic acid 3.8 (HH) 0.4 - 2.0 mmol/L   TROPONIN-HIGH SENSITIVITY    Collection Time: 08/30/22 11:05 PM   Result Value Ref Range    Troponin-High Sensitivity 170 (HH) 0 - 78 ng/L   METABOLIC PANEL, COMPREHENSIVE    Collection Time: 08/31/22  5:20 AM   Result Value Ref Range    Sodium 143 136 - 145 mmol/L    Potassium 2.6 (LL) 3.5 - 5.5 mmol/L    Chloride 110 100 - 111 mmol/L    CO2 24 21 - 32 mmol/L    Anion gap 9 3.0 - 18.0 mmol/L    Glucose 122 (H) 74 - 99 mg/dL    BUN 26 (H) 7 - 18 mg/dL    Creatinine 1.41 (H) 0.60 - 1.30 mg/dL    BUN/Creatinine ratio 18 12 - 20      GFR est AA 58 (L) >60 ml/min/1.73m2    GFR est non-AA 48 (L) >60 ml/min/1.73m2    Calcium 8.2 (L) 8.5 - 10.1 mg/dL    Bilirubin, total 0.7 0.2 - 1.0 mg/dL    AST (SGOT) 77 (H) 10 - 38 U/L    ALT (SGPT) 55 16 - 61 U/L    Alk.  phosphatase 70 45 - 117 U/L    Protein, total 6.0 (L) 6.4 - 8.2 g/dL    Albumin 2.7 (L) 3.4 - 5.0 g/dL    Globulin 3.3 2.0 - 4.0 g/dL    A-G Ratio 0.8 0.8 - 1.7     MAGNESIUM    Collection Time: 08/31/22  5:20 AM   Result Value Ref Range    Magnesium 2.0 1.6 - 2.6 mg/dL   CBC WITH AUTOMATED DIFF    Collection Time: 08/31/22  5:20 AM   Result Value Ref Range    WBC 9.6 4.6 - 13.2 K/uL    RBC 3.66 (L) 4.35 - 5.65 M/uL    HGB 11.9 (L) 13.0 - 16.0 g/dL    HCT 34.6 (L) 36.0 - 48.0 %    MCV 94.5 78.0 - 100.0 FL    MCH 32.5 24.0 - 34.0 PG    MCHC 34.4 31.0 - 37.0 g/dL    RDW 13.6 11.6 - 14.5 %    PLATELET 242 255 - 722 K/uL    MPV 11.0 9.2 - 11.8 FL    NRBC 0.0 0.0  WBC    ABSOLUTE NRBC 0.00 0.00 - 0.01 K/uL    NEUTROPHILS 82 (H) 40 - 73 %    BAND NEUTROPHILS 17 (H) 0 - 5 %    LYMPHOCYTES 1 (L) 21 - 52 %    MONOCYTES 0 (L) 3 - 10 %    EOSINOPHILS 0 0 - 5 %    BASOPHILS 0 0 - 2 %    IMMATURE GRANULOCYTES 0 %    ABS. NEUTROPHILS 9.5 (H) 1.8 - 8.0 K/UL    ABS. LYMPHOCYTES 0.1 (L) 0.9 - 3.6 K/UL    ABS. MONOCYTES 0.0 (L) 0.05 - 1.2 K/UL    ABS. EOSINOPHILS 0.0 0.0 - 0.4 K/UL    ABS. BASOPHILS 0.0 0.0 - 0.1 K/UL    ABS. IMM. GRANS. 0.0 K/UL    DF AUTOMATED      RBC COMMENTS Normocytic, Normochromic     TROPONIN-HIGH SENSITIVITY    Collection Time: 08/31/22  5:20 AM   Result Value Ref Range    Troponin-High Sensitivity 208 (HH) 0 - 78 ng/L   EKG, 12 LEAD, SUBSEQUENT    Collection Time: 08/31/22  5:36 AM   Result Value Ref Range    Ventricular Rate 109 BPM    QRS Duration 130 ms    Q-T Interval 356 ms    QTC Calculation (Bezet) 479 ms    Calculated R Axis 6 degrees    Calculated T Axis 21 degrees    Diagnosis       Atrial fibrillation with rapid ventricular response  Right bundle branch block  Abnormal ECG  When compared with ECG of 30-AUG-2022 19:02,  PREVIOUS ECG IS PRESENT         Radiologic Studies -   [unfilled]  CT Results  (Last 48 hours)                 08/30/22 1849  CT HEAD WO CONT Final result    Impression:      1. No acute intracranial abnormalities are identified. No CT evidence to   suggest acute intracranial hematoma, cortical infarct, or mass effect/mass   lesion. No significant change.        2. Moderate diffuse volume loss with moderate white matter hypodensity likely   chronic microvascular ischemic disease. Narrative:  EXAM: CT head       CLINICAL INDICATION/HISTORY: Altered level of consciousness, recent fall. COMPARISON: 8/9/2022. TECHNIQUE: Axial CT imaging of the head  was obtained from skull base to vertex   without intravenous contrast. Coronal and sagittal reconstructions were   obtained. One or more dose reduction techniques were used on this CT: automated   exposure control, adjustment of the mAs and/or kVp according to patient's size,   and iterative reconstruction techniques. The specific techniques utilized on   this CT exam have been documented in the patient's electronic medical record. Digital imaging and communications and medicine (DICOM) format image data are   available to nonaffiliated external healthcare facilities or entities on a   secure, media free, reciprocally searchable basis with patient authorization for   at least a 12 month period after this study. _______________       FINDINGS:       BRAIN AND POSTERIOR FOSSA: Stable moderate diffuse ventriculomegaly with   concordant diffuse cortical volume loss. There is no intracranial hemorrhage,   mass effect, or shift of midline structures. No new cortical abnormal   hypodensity. Stable extensive bilateral white matter hypodensity nonspecific. EXTRA-AXIAL SPACES AND MENINGES: There are no abnormal extra-axial fluid   collections. CALVARIUM: No acute osseous abnormality. SINUSES: Ethmoid and maxillary sinus mucosal thickening with several small   retention cysts or polyps inferior maxillary sinuses with no fluid level. OTHER: Mastoids clear.       _______________           08/30/22 1849  CT SPINE CERV WO CONT Final result    Impression:      1. No acute osseous findings. 2. Multilevel degenerative spondylosis, with moderate to severe canal stenosis   and cord flattening C5/6.  Bilateral multilevel degenerative foraminal stenosis. Narrative:  EXAM:CT cervical spine without contrast       CLINICAL INDICATION/HISTORY: Status post fall with neck pain       COMPARISON: [None]       TECHNIQUE:  Routine noncontrast helically acquired cervical spine CT was   performed with sagittal and coronal reconstructed images obtained. One or more   dose reduction techniques were used on this CT: automated exposure control,   adjustment of the mAs and/or kVp according to patient's size, and iterative   reconstruction techniques. The specific techniques utilized on this CT exam have   been documented in the patient's electronic medical record. Digital imaging and   communications and medicine (DICOM) format image data are available to   nonaffiliated external healthcare facilities or entities on a secure, media   free, reciprocally searchable basis with patient authorization for at least a 12   month period after this study. ________________       FINDINGS:       ALIGNMENT AND OSSEOUS STRUCTURES: Slight degenerative anterior listhesis C3/4   and C4/5 with no evidence of fracture. [No abnormal neoplastic lytic or   sclerotic lesion.]       NECK SOFT TISSUE AND OTHER ANCILLARY FINDINGS: [Visualized base of brain   unremarkable.] Scarring and emphysematous changes with no abnormal mass lung   apices. Superior mediastinum unremarkable. [No evidence of mass or adenopathy.]   Right-sided carotid stent noted with atherosclerotic changes left carotid   bifurcation. CERVICAL DISC LEVELS, CORRELATING AXIAL AND REFORMATTED IMAGING:       C2/3: Degenerative changes without high-grade canal stenosis. C3/4: Degenerative changes with small posterior disc protrusion and mild canal   stenosis. Moderate bilateral foraminal stenosis worse on the right. C4/5: Severe asymmetric left-sided facet arthropathy with mild to moderate canal   stenosis with moderate to severe left-sided and mild right-sided foraminal   stenosis.        C5/6: Severe disc space narrowing and degenerative spondylosis with moderate to   severe canal stenosis with severe bilateral foraminal stenosis. C6/7: Severe disc space narrowing with degenerative spondylosis with mild canal   stenosis and mild to moderate foraminal stenosis worse on the right. C7/T1: [Unremarkable.]       []       _________________                 CXR Results  (Last 48 hours)                 08/30/22 1840  XR CHEST PORT Final result    Impression:      1. Negative chest.       Narrative:  EXAM: Portable frontal view of the chest.       CLINICAL INDICATION/HISTORY: Hypotension, weakness       COMPARISON: 8/9/2022       _______________       FINDINGS:        The lungs are clear and the heart is normal size.       _______________                   Medical Decision Making and ED Course   Differential Diagnosis & Medical Decision Making Provider Note:   Endorsed to Dr Landen Griffith at 7:20p    - I am the first and primary provider for this patient. I reviewed the vital signs, available nursing notes, past medical history, past surgical history, family history and social history. The patient's presenting problems have been discussed, and the staff are in agreement with the care plan formulated and outlined with them. I have encouraged them to ask questions as they arise throughout their visit. Vital Signs-Reviewed the patient's vital signs.   Patient Vitals for the past 12 hrs:   Temp Pulse Resp BP SpO2   08/31/22 0559 -- 88 -- -- --   08/31/22 0555 -- (!) 122 -- -- --   08/31/22 0547 -- (!) 124 -- (!) 144/72 --   08/31/22 0533 -- (!) 167 22 -- 93 %   08/31/22 0430 100.2 °F (37.9 °C) 82 19 (!) 132/55 96 %   08/31/22 0400 -- (!) 105 -- -- --   08/31/22 0347 -- -- -- -- 94 %   08/31/22 0325 (!) 101.7 °F (38.7 °C) (!) 102 19 (!) 171/49 94 %   08/31/22 0231 -- (!) 101 20 (!) 147/87 98 %   08/31/22 0131 -- 97 20 (!) 137/59 97 %   08/31/22 0030 -- (!) 101 17 (!) 150/79 96 %   08/30/22 2339 -- 99 18 (!) 126/55 96 % 08/30/22 2300 -- (!) 116 20 (!) 149/65 97 %   08/30/22 2210 -- (!) 107 18 (!) 146/69 97 %   08/30/22 2131 98.3 °F (36.8 °C) (!) 112 21 (!) 140/81 97 %   08/30/22 2053 -- 100 19 (!) 142/54 96 %   08/30/22 2004 -- (!) 112 19 (!) 121/58 93 %   08/30/22 1918 -- (!) 116 18 (!) 125/53 --       EKG interpretation: (Preliminary): EKG Interpreted by me. Shows AFIB  109/ min RBBB MVR               Procedures and Critical Care     Performed by: Justyn Choudhary MD  Procedures      CRITICAL CARE NOTE :    6:04 PM    IMPENDING DETERIORATION -CNS, Metabolic, and Renal  ASSOCIATED RISK FACTORS - Hypotension, Dysrhythmia, Metabolic changes, and Dehydration  MANAGEMENT- Bedside Assessment  INTERPRETATION -  Xrays, CT Scan, and Blood Pressure  INTERVENTIONS - hemodynamic mngmt  CASE REVIEW - Hospitalist/Intensivist  TREATMENT RESPONSE -Improved  PERFORMED BY - Self    NOTES   :  I have spent 60 minutes of critical care time involved in lab review, consultations with specialist, family decision- making, bedside attention and documentation. This time excludes time spent in any separate billed procedures. During this entire length of time I was immediately available to the patient . Justyn Choudhary MD    Disposition   Disposition: Admitted to Floor Medical Floor the case was discussed with the admitting physician Carrie Ovieod MD    DISCHARGE PLAN:  1. Current Discharge Medication List        CONTINUE these medications which have NOT CHANGED    Details   aspirin 81 mg chewable tablet Take 1 Tablet by mouth daily. Qty: 30 Tablet, Refills: 0      atorvastatin (LIPITOR) 80 mg tablet Take 1 Tablet by mouth nightly. Qty: 30 Tablet, Refills: 1      ticagrelor (BRILINTA) 90 mg tablet Take 0.5 Tablets by mouth every twelve (12) hours for 30 days. Qty: 60 Tablet, Refills: 0      levETIRAcetam (Keppra) 1,000 mg tablet Take 1 Tablet by mouth two (2) times a day.   Qty: 60 Tablet, Refills: 0      pantoprazole (PROTONIX) 40 mg tablet Take 1 Tablet by mouth every twelve (12) hours. Qty: 60 Tablet, Refills: 1      amLODIPine (NORVASC) 10 mg tablet TAKE 1 TABLET DAILY  Qty: 90 Tablet, Refills: 0    Associated Diagnoses: Essential hypertension      cyanocobalamin (VITAMIN B12) 500 mcg tablet Take 1 Tablet by mouth daily. Qty: 90 Tablet, Refills: 1           2. Follow-up Information    None       3. Return to ED if worse   4. Current Discharge Medication List        Remove if admitted/discharged    Diagnosis/Clinical Impression     Clinical Impression:   1. Altered mental status, unspecified altered mental status type    2. Urinary tract infection without hematuria, site unspecified    3. COVID    4. ABBY (acute kidney injury) ([facility] [address]        Attestations: Silvia Garcia MD, am the primary clinician of record. Please note that this dictation was completed with "[employer] the [employer] voice recognition software. Quite often unanticipated grammatical, syntax, homophones, and other interpretive errors are inadvertently transcribed by the computer software. Please disregard these errors. Please excuse any errors that have escaped final proofreading. Thank you.

## 2022-08-30 NOTE — ED TRIAGE NOTES
Pt sent to ED from [facility] \"because the RN could not get a blood pressure on him and she said if she could not get a pressure on him, he could not stay there\" per EMS. Pt also fell out of the bed today, he was found lying on the floor and staff could not say how long he had been there. Pt has abrasion noted to forehead. Pt had a fever yesterday and tested positive for COVID today. Pt is altered, alert x 1, however [facility] staff reported to EMS that pt was alert and oriented x 4 \"last week\"    Pt has hx of CVA.

## 2022-08-30 NOTE — ED NOTES
Per wife, pt fell out of bed earlier this am.  Wife states that pt has been very sleepy since yesterday, but could not clarify if or when pt's mental status change occurred. Wife states that the last 2 times she visited pt, he has just been very sleepy and he did not talk to her much.

## 2022-08-31 PROBLEM — I48.91 ATRIAL FIBRILLATION WITH RVR (HCC): Status: ACTIVE | Noted: 2022-01-01

## 2022-08-31 PROBLEM — A41.9 SEPSIS (HCC): Status: ACTIVE | Noted: 2022-01-01

## 2022-08-31 PROBLEM — R41.82 ALTERED MENTAL STATUS: Status: ACTIVE | Noted: 2022-01-01

## 2022-08-31 PROBLEM — N39.0 UTI (URINARY TRACT INFECTION): Status: ACTIVE | Noted: 2022-01-01

## 2022-08-31 PROBLEM — U07.1 COVID: Status: ACTIVE | Noted: 2022-01-01

## 2022-08-31 NOTE — ED NOTES
Wife called and gave password to be able to check up on her . Password is \"[email or web address]\".  Can get a hold of wife at [phone]

## 2022-08-31 NOTE — PROGRESS NOTES
Morning rounds done. Patient is still in atrial fibrillation and altered mental status. Has some expressive aphasia. Reviewed records and he was recently discharged after receiving stent in AUDRA due to severe AUDRA stenosis and was started on keppra due to new onset seizure. Plan:     Start heparin drip, Ordered MRI/MRA head and neck, echo, cardiology consult. Consult to speech therapy due to aphasia. Convert all medications to IV.

## 2022-08-31 NOTE — H&P
History and Physical    Subjective:     Karl Mcneill is a 79 y.o. 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 Patient a resident of [facility]. Recently tested positive for COVID. Transported to the ED via EMS after nurse attempted to get vital signs and couldn't obtain a blood pressure. Prior to getting vital signs patient was found down in floor. Upon arrival to ED, patient stable. BP 98/40, . Patient confused, which wife stated is not his baseline. CBC not concerning. Noted to have some mild hypokalemia, ABBY. Lactic acid 3.7, troponin 150. Ekg sinus tach in ED. CT of head and Spine negative. CXR negative. UA with small amount of blood and 2+ bacteria. Received Ceftriaxone IV in ED. Admitted to telemetry. Patient examined at bedside. Unable to answer questions appropriately. Restless, attempting to get out of bed. HR noted to intermittently increase to the 160s even when resting. Past Medical History:   Diagnosis Date    [facility] 2022    Dyslipidemia 2020    Hypertension     AUDRA symptomatic acute occlusion then severe stenosis with ischemia but no infarct 2022    Seizure [facility] 2022      Past Surgical History:   Procedure Laterality Date    HX CAROTID STENT Right 08/15/2022    AUDRA TCAR     Family History   Problem Relation Age of Onset    Dementia Mother       Social History     Tobacco Use    Smoking status: Former     Packs/day: 1.00     Years: 2.00     Pack years: 2.00     Types: Cigarettes     Quit date: 1960     Years since quittin.7    Smokeless tobacco: Never   Substance Use Topics    Alcohol use: Not on file       Prior to Admission medications    Medication Sig Start Date End Date Taking?  Authorizing Provider   cefTRIAXone (ROCEPHIN) 1 gram injection 1 g by IntraMUSCular route once. Yes Brenda, MD Kj   clopidogreL (PLAVIX) 75 mg tab Take 75 mg by mouth daily. Yes Kj Gutierrez MD   loratadine 10 mg cap Take  by mouth daily. Yes Kj Gutierrez MD   guaiFENesin ER (Mucinex) 600 mg ER tablet Take 600 mg by mouth two (2) times a day. X 7 days   Yes Kj Gutierrez MD   acetaminophen (Tylenol Extra Strength) 500 mg tablet Take 1,000 mg by mouth two (2) times a day. Yes Brenda, MD Kj   ascorbic acid, vitamin C, (VITAMIN C) 500 mg tablet Take 500 mg by mouth two (2) times a day. Yes Brenda, MD Kj   CHOLECALCIFEROL, VITAMIN D3, PO Take 5,000 Units by mouth daily. Yes Kj Gutierrez MD   zinc sulfate (ZINC-220 PO) Take  by mouth daily. Yes Kj Gutierrez MD   azithromycin (ZITHROMAX) 250 mg tablet Take 250 mg by mouth daily. Yes Kj Gutierrez MD   azithromycin (ZITHROMAX) 500 mg tab Take 500 mg by mouth daily. Yes Kj Gutierrez MD   aspirin 81 mg chewable tablet Take 1 Tablet by mouth daily. 8/20/22  Yes Zoran Collins MD   atorvastatin (LIPITOR) 80 mg tablet Take 1 Tablet by mouth nightly. 8/19/22  Yes Zoran Collins MD   levETIRAcetam (Keppra) 1,000 mg tablet Take 1 Tablet by mouth two (2) times a day. 8/19/22  Yes Zoran Collins MD   pantoprazole (PROTONIX) 40 mg tablet Take 1 Tablet by mouth every twelve (12) hours. 8/19/22  Yes Zoran Collins MD   amLODIPine (NORVASC) 10 mg tablet TAKE 1 TABLET DAILY 7/6/22  Yes Ann Torres MD   cyanocobalamin (VITAMIN B12) 500 mcg tablet Take 1 Tablet by mouth daily. 1/20/22  Yes Ann Torres MD   [facility]) 90 mg tablet Take 0.5 Tablets by mouth every twelve (12) hours for 30 days. Patient not taking: Reported on 8/30/2022 8/19/22 9/18/22  Zoran Collins MD     Allergies   Allergen Reactions    Pollen Extracts Runny Nose         Review of Systems   Reason unable to perform ROS: altered mental status.         Objective:   VITALS:    Visit Vitals  BP (!) 144/72   Pulse 88 Temp 100.2 °F (37.9 °C)   Resp 22   Ht 5' 7\" (1.702 m)   Wt 72.7 kg (160 lb 4.8 oz)   SpO2 93%   BMI 25.11 kg/m²       Physical Exam  Constitutional:       Appearance: He is ill-appearing. HENT:      Head: Normocephalic and atraumatic. Right Ear: External ear normal.      Left Ear: External ear normal.      Nose: Nose normal.      Mouth/Throat:      Mouth: Mucous membranes are moist.   Eyes:      Conjunctiva/sclera: Conjunctivae normal.   Cardiovascular:      Rate and Rhythm: Tachycardia present. Rhythm irregular. Pulses: Normal pulses. Heart sounds: Normal heart sounds. Pulmonary:      Effort: Pulmonary effort is normal.      Breath sounds: Normal breath sounds. Abdominal:      General: Abdomen is flat. Bowel sounds are normal.      Palpations: Abdomen is soft. Musculoskeletal:         General: Normal range of motion. Cervical back: Normal range of motion. Skin:     General: Skin is warm and dry. Neurological:      Mental Status: He is alert. He is disoriented. Psychiatric:         Behavior: Behavior is agitated.          Cognition and Memory: Cognition is impaired.       _______________________________________________________________________  Care Plan discussed with:    Comments   Patient X    Family      RN X    Care Manager                    Consultant:      _______________________________________________________________________  Expected  Disposition:   Home with Family X   HH/PT/OT/RN    SNF/LTC    KIKO    ________________________________________________________________________  TOTAL TIME:  48 Minutes    Critical Care Provided     Minutes non procedure based      Comments    X Reviewed previous records   >50% of visit spent in counseling and coordination of care X Discussion with patient and/or family and questions answered       ________________________________________________________________________    Labs:  Recent Results (from the past 24 hour(s))   CBC WITH AUTOMATED DIFF    Collection Time: 08/30/22  5:00 PM   Result Value Ref Range    WBC 9.6 4.6 - 13.2 K/uL    RBC 3.97 (L) 4.35 - 5.65 M/uL    HGB 13.0 13.0 - 16.0 g/dL    HCT 38.1 36.0 - 48.0 %    MCV 96.0 78.0 - 100.0 FL    MCH 32.7 24.0 - 34.0 PG    MCHC 34.1 31.0 - 37.0 g/dL    RDW 13.5 11.6 - 14.5 %    PLATELET 162 838 - 397 K/uL    MPV 11.4 9.2 - 11.8 FL    NRBC 0.0 0.0  WBC    ABSOLUTE NRBC 0.00 0.00 - 0.01 K/uL    NEUTROPHILS 92 (H) 40 - 73 %    BAND NEUTROPHILS 4 0 - 5 %    LYMPHOCYTES 2 (L) 21 - 52 %    MONOCYTES 1 (L) 3 - 10 %    EOSINOPHILS 0 0 - 5 %    BASOPHILS 1 0 - 2 %    IMMATURE GRANULOCYTES 0 %    ABS. NEUTROPHILS 9.2 (H) 1.8 - 8.0 K/UL    ABS. LYMPHOCYTES 0.2 (L) 0.9 - 3.6 K/UL    ABS. MONOCYTES 0.1 0.05 - 1.2 K/UL    ABS. EOSINOPHILS 0.0 0.0 - 0.4 K/UL    ABS. BASOPHILS 0.1 0.0 - 0.1 K/UL    ABS. IMM. GRANS. 0.0 K/UL    DF Manual      RBC COMMENTS Anisocytosis  1+       METABOLIC PANEL, COMPREHENSIVE    Collection Time: 08/30/22  5:00 PM   Result Value Ref Range    Sodium 141 136 - 145 mmol/L    Potassium 3.4 (L) 3.5 - 5.5 mmol/L    Chloride 104 100 - 111 mmol/L    CO2 23 21 - 32 mmol/L    Anion gap 14 3.0 - 18.0 mmol/L    Glucose 144 (H) 74 - 99 mg/dL    BUN 20 (H) 7 - 18 mg/dL    Creatinine 1.42 (H) 0.60 - 1.30 mg/dL    BUN/Creatinine ratio 14 12 - 20      GFR est AA 58 (L) >60 ml/min/1.73m2    GFR est non-AA 48 (L) >60 ml/min/1.73m2    Calcium 8.9 8.5 - 10.1 mg/dL    Bilirubin, total 1.0 0.2 - 1.0 mg/dL    AST (SGOT) 58 (H) 10 - 38 U/L    ALT (SGPT) 52 16 - 61 U/L    Alk.  phosphatase 92 45 - 117 U/L    Protein, total 7.3 6.4 - 8.2 g/dL    Albumin 3.4 3.4 - 5.0 g/dL    Globulin 3.9 2.0 - 4.0 g/dL    A-G Ratio 0.9 0.8 - 1.7     LACTIC ACID    Collection Time: 08/30/22  5:00 PM   Result Value Ref Range    Lactic acid 3.7 (HH) 0.4 - 2.0 mmol/L   MAGNESIUM    Collection Time: 08/30/22  5:00 PM   Result Value Ref Range    Magnesium 2.0 1.6 - 2.6 mg/dL   CULTURE, BLOOD    Collection Time: 08/30/22  5:00 PM    Specimen: Blood   Result Value Ref Range    Special Requests: No Special Requests      Culture result: PENDING    TROPONIN-HIGH SENSITIVITY    Collection Time: 08/30/22  5:00 PM   Result Value Ref Range    Troponin-High Sensitivity 150 (HH) 0 - 78 ng/L   CULTURE, BLOOD    Collection Time: 08/30/22  7:00 PM    Specimen: Blood   Result Value Ref Range    Special Requests: No Special Requests      Culture result: PENDING    EKG, 12 LEAD, INITIAL    Collection Time: 08/30/22  7:01 PM   Result Value Ref Range    Ventricular Rate 100 BPM    Atrial Rate 183 BPM    P-R Interval 132 ms    QRS Duration 135 ms    Q-T Interval 339 ms    QTC Calculation (Bezet) 438 ms    Calculated P Axis -37 degrees    Calculated R Axis 10 degrees    Calculated T Axis 28 degrees    Diagnosis       Sinus tachycardia  Multiple premature complexes, vent & supraven  Right bundle branch block  Baseline wander in lead(s) V3     URINALYSIS W/ RFLX MICROSCOPIC    Collection Time: 08/30/22  8:25 PM   Result Value Ref Range    Color Dark Yellow      Appearance Clear      Specific gravity 1.022 1.005 - 1.030      pH (UA) 5.5 5.0 - 8.0      Protein 300 (A) Negative mg/dL    Glucose Negative Negative mg/dL    Ketone 15 (A) Negative mg/dL    Bilirubin Negative Negative      Blood Small (A) Negative      Urobilinogen 0.2 0.2 - 1.0 EU/dL    Nitrites Negative Negative      Leukocyte Esterase Negative Negative     URINE MICROSCOPIC    Collection Time: 08/30/22  8:25 PM   Result Value Ref Range    WBC 0-4 0 - 4 /hpf    RBC 0-5 0 - 2 /hpf    Epithelial cells Few 0 - 20 /lpf    Bacteria 2+ (A) None /hpf    Mixed Cell Cast 2-5 /lpf   LACTIC ACID    Collection Time: 08/30/22  8:30 PM   Result Value Ref Range    Lactic acid 3.8 (HH) 0.4 - 2.0 mmol/L   TROPONIN-HIGH SENSITIVITY    Collection Time: 08/30/22 11:05 PM   Result Value Ref Range    Troponin-High Sensitivity 170 (HH) 0 - 78 ng/L   CBC WITH AUTOMATED DIFF    Collection Time: 08/31/22  5:20 AM   Result Value Ref Range    WBC 9.6 4.6 - 13.2 K/uL    RBC 3.66 (L) 4.35 - 5.65 M/uL    HGB 11.9 (L) 13.0 - 16.0 g/dL    HCT 34.6 (L) 36.0 - 48.0 %    MCV 94.5 78.0 - 100.0 FL    MCH 32.5 24.0 - 34.0 PG    MCHC 34.4 31.0 - 37.0 g/dL    RDW 13.6 11.6 - 14.5 %    PLATELET 576 947 - 307 K/uL    MPV 11.0 9.2 - 11.8 FL    NRBC 0.0 0.0  WBC    ABSOLUTE NRBC 0.00 0.00 - 0.01 K/uL    NEUTROPHILS PENDING %    LYMPHOCYTES PENDING %    MONOCYTES PENDING %    EOSINOPHILS PENDING %    BASOPHILS PENDING %    IMMATURE GRANULOCYTES PENDING %    ABS. NEUTROPHILS PENDING K/UL    ABS. LYMPHOCYTES PENDING K/UL    ABS. MONOCYTES PENDING K/UL    ABS. EOSINOPHILS PENDING K/UL    ABS. BASOPHILS PENDING K/UL    ABS. IMM. GRANS. PENDING K/UL    DF PENDING    TROPONIN-HIGH SENSITIVITY    Collection Time: 08/31/22  5:20 AM   Result Value Ref Range    Troponin-High Sensitivity 208 (HH) 0 - 78 ng/L   EKG, 12 LEAD, SUBSEQUENT    Collection Time: 08/31/22  5:36 AM   Result Value Ref Range    Ventricular Rate 109 BPM    QRS Duration 130 ms    Q-T Interval 356 ms    QTC Calculation (Bezet) 479 ms    Calculated R Axis 6 degrees    Calculated T Axis 21 degrees    Diagnosis       Atrial fibrillation with rapid ventricular response  Right bundle branch block  Abnormal ECG  When compared with ECG of 30-AUG-2022 19:02,  PREVIOUS ECG IS PRESENT         Imaging:  CT HEAD WO CONT    Result Date: 8/30/2022  EXAM: CT head CLINICAL INDICATION/HISTORY: Altered level of consciousness, recent fall. COMPARISON: 8/9/2022. TECHNIQUE: Axial CT imaging of the head  was obtained from skull base to vertex without intravenous contrast. Coronal and sagittal reconstructions were obtained. One or more dose reduction techniques were used on this CT: automated exposure control, adjustment of the mAs and/or kVp according to patient's size, and iterative reconstruction techniques.  The specific techniques utilized on this CT exam have been documented in the patient's electronic medical record. Digital imaging and communications and medicine (DICOM) format image data are available to nonaffiliated external healthcare facilities or entities on a secure, media free, reciprocally searchable basis with patient authorization for at least a 12 month period after this study. _______________ FINDINGS: BRAIN AND POSTERIOR FOSSA: Stable moderate diffuse ventriculomegaly with concordant diffuse cortical volume loss. There is no intracranial hemorrhage, mass effect, or shift of midline structures. No new cortical abnormal hypodensity. Stable extensive bilateral white matter hypodensity nonspecific. EXTRA-AXIAL SPACES AND MENINGES: There are no abnormal extra-axial fluid collections. CALVARIUM: No acute osseous abnormality. SINUSES: Ethmoid and maxillary sinus mucosal thickening with several small retention cysts or polyps inferior maxillary sinuses with no fluid level. OTHER: Mastoids clear. _______________     1. No acute intracranial abnormalities are identified. No CT evidence to suggest acute intracranial hematoma, cortical infarct, or mass effect/mass lesion. No significant change. 2. Moderate diffuse volume loss with moderate white matter hypodensity likely chronic microvascular ischemic disease. CT SPINE CERV WO CONT    Result Date: 8/30/2022  EXAM:CT cervical spine without contrast CLINICAL INDICATION/HISTORY: Status post fall with neck pain COMPARISON: [None] TECHNIQUE:  Routine noncontrast helically acquired cervical spine CT was performed with sagittal and coronal reconstructed images obtained. One or more dose reduction techniques were used on this CT: automated exposure control, adjustment of the mAs and/or kVp according to patient's size, and iterative reconstruction techniques. The specific techniques utilized on this CT exam have been documented in the patient's electronic medical record.   Digital imaging and communications and medicine (DICOM) format image data are available to nonaffiliated external healthcare facilities or entities on a secure, media free, reciprocally searchable basis with patient authorization for at least a 12 month period after this study. ________________ FINDINGS: ALIGNMENT AND OSSEOUS STRUCTURES: Slight degenerative anterior listhesis C3/4 and C4/5 with no evidence of fracture. [No abnormal neoplastic lytic or sclerotic lesion.] NECK SOFT TISSUE AND OTHER ANCILLARY FINDINGS: [Visualized base of brain unremarkable.] Scarring and emphysematous changes with no abnormal mass lung apices. Superior mediastinum unremarkable. [No evidence of mass or adenopathy.] Right-sided carotid stent noted with atherosclerotic changes left carotid bifurcation. CERVICAL DISC LEVELS, CORRELATING AXIAL AND REFORMATTED IMAGING: C2/3: Degenerative changes without high-grade canal stenosis. C3/4: Degenerative changes with small posterior disc protrusion and mild canal stenosis. Moderate bilateral foraminal stenosis worse on the right. C4/5: Severe asymmetric left-sided facet arthropathy with mild to moderate canal stenosis with moderate to severe left-sided and mild right-sided foraminal stenosis. C5/6: Severe disc space narrowing and degenerative spondylosis with moderate to severe canal stenosis with severe bilateral foraminal stenosis. C6/7: Severe disc space narrowing with degenerative spondylosis with mild canal stenosis and mild to moderate foraminal stenosis worse on the right. C7/T1: [Unremarkable.] [] _________________     1. No acute osseous findings. 2. Multilevel degenerative spondylosis, with moderate to severe canal stenosis and cord flattening C5/6. Bilateral multilevel degenerative foraminal stenosis. XR CHEST PORT    Result Date: 8/30/2022  EXAM: Portable frontal view of the chest. CLINICAL INDICATION/HISTORY: Hypotension, weakness COMPARISON: 8/9/2022 _______________ FINDINGS: The lungs are clear and the heart is normal size. _______________     1.  Negative chest. Assessment & Plan: Altered mental status  -Possibly related to UTI and/or COVID infection  -CT head: No acute intracranial abnormalities are identified. No CT evidence to suggest acute intracranial hematoma, cortical infarct, or mass effect/mass lesion. No significant change. Moderate diffuse volume loss with moderate white matter hypodensity likely  chronic microvascular ischemic disease  -Fall precautions    Sepsis ([facility]  -Possibly Urosepsis  -UA with small amount of blood and 2+ bacteria  -Given ceftriaxone in ED, continue for now   -Blood cultures and urine cultures pending  -Tachycardic, HR as high as 160s. -Lactic acid 3.7, increased to 3.8. Awaiting repeat  -Administer another fluid bolus    COVID  -Droplet plus precautions  -Monitor respiratory status    Atrial fibrillation with RVR (HCC)  -HR liabile, ranging from 110s to 160s. EKG showed Atrial fibrillation with RVR. Sinus tach in ED  -Initial troponin 150, then 170 and 208  -Lopressor 5mg x1 now  -Cardiology input greatly appreciated     ABBY  -Cr 0.79, BUN 10, and GFR>60 8/20/22. Yesterday Cr 1.42, BUN 20, GFR 48.  -Continue IV fluids  -Repeat BMP pending, trend renal function    Code Status: DNR    Prophylaxis: Lovenox      Electronically Signed :  Jonathan Echevarria. Dejon Moreau, MSN, [facility] Medicine [facility]        Dragon voice recognition was used to generate this report, which may have resulted in some phonetic based errors in grammar and contents.  Even though attempts were made to correct all the mistakes, some may have been missed, and remained in the body of the document

## 2022-08-31 NOTE — PROGRESS NOTES
Spoke w/wife who is now refusing consent for PICC/Midline.  Nursing supervisor aware and will notify MD.

## 2022-08-31 NOTE — PROGRESS NOTES
Problem: Pressure Injury - Risk of  Goal: *Prevention of pressure injury  Description: Document Yony Scale and appropriate interventions in the flowsheet.   Outcome: Progressing Towards Goal  Note: Pressure Injury Interventions:  Sensory Interventions: Assess changes in LOC, Keep linens dry and wrinkle-free, Minimize linen layers    Moisture Interventions: Absorbent underpads, Apply protective barrier, creams and emollients    Activity Interventions: Pressure redistribution bed/mattress(bed type)    Mobility Interventions: HOB 30 degrees or less    Nutrition Interventions: Document food/fluid/supplement intake, Offer support with meals,snacks and hydration    Friction and Shear Interventions: Minimize layers

## 2022-08-31 NOTE — PROGRESS NOTES
Problem: Pressure Injury - Risk of  Goal: *Prevention of pressure injury  Description: Document Yony Scale and appropriate interventions in the flowsheet. Outcome: Progressing Towards Goal  Note: Pressure Injury Interventions:  Sensory Interventions: Assess changes in LOC, Keep linens dry and wrinkle-free, Minimize linen layers, Monitor skin under medical devices, Pressure redistribution bed/mattress (bed type), Avoid rigorous massage over bony prominences    Moisture Interventions: Absorbent underpads, Internal/External urinary devices, Minimize layers, Maintain skin hydration (lotion/cream), Limit adult briefs    Activity Interventions: Pressure redistribution bed/mattress(bed type)    Mobility Interventions: Pressure redistribution bed/mattress (bed type), PT/OT evaluation, HOB 30 degrees or less    Nutrition Interventions: Document food/fluid/supplement intake    Friction and Shear Interventions: Minimize layers, Apply protective barrier, creams and emollients                Problem: Patient Education: Go to Patient Education Activity  Goal: Patient/Family Education  Outcome: Progressing Towards Goal     Problem: Falls - Risk of  Goal: *Absence of Falls  Description: Document Yang Fall Risk and appropriate interventions in the flowsheet.   Outcome: Progressing Towards Goal  Note: Fall Risk Interventions:       Mentation Interventions: Adequate sleep, hydration, pain control, Bed/chair exit alarm, Room close to nurse's station, Update white board         Elimination Interventions: Bed/chair exit alarm, Call light in reach    History of Falls Interventions: Room close to nurse's station, Vital signs minimum Q4HRs X 24 hrs (comment for end date), Bed/chair exit alarm         Problem: Patient Education: Go to Patient Education Activity  Goal: Patient/Family Education  Outcome: Progressing Towards Goal     Problem: Pain  Goal: *Control of Pain  Outcome: Progressing Towards Goal  Goal: *PALLIATIVE CARE: Alleviation of Pain  Outcome: Progressing Towards Goal     Problem: Patient Education: Go to Patient Education Activity  Goal: Patient/Family Education  Outcome: Progressing Towards Goal     Problem: General Medical Care Plan  Goal: *Vital signs within specified parameters  Outcome: Progressing Towards Goal  Goal: *Labs within defined limits  Outcome: Progressing Towards Goal  Goal: *Absence of infection signs and symptoms  Outcome: Progressing Towards Goal  Goal: *Optimal pain control at patient's stated goal  Outcome: Progressing Towards Goal  Goal: *Skin integrity maintained  Outcome: Progressing Towards Goal  Goal: *Fluid volume balance  Outcome: Progressing Towards Goal  Goal: *Optimize nutritional status  Outcome: Progressing Towards Goal  Goal: *Anxiety reduced or absent  Outcome: Progressing Towards Goal  Goal: *Progressive mobility and function (eg: ADL's)  Outcome: Progressing Towards Goal     Problem: Patient Education: Go to Patient Education Activity  Goal: Patient/Family Education  Outcome: Progressing Towards Goal     Problem: Altered Thought Process (Adult/Pediatric)  Goal: *STG: Participates in treatment plan  Outcome: Progressing Towards Goal  Goal: *STG: Remains safe in hospital  Outcome: Progressing Towards Goal  Goal: *STG: Seeks staff when feelings of anxiety and fear arise  Outcome: Progressing Towards Goal  Goal: *STG: Complies with medication therapy  Outcome: Progressing Towards Goal  Goal: *STG: Attends activities and groups  Outcome: Progressing Towards Goal  Goal: *STG: Decreased delusional thinking  Outcome: Progressing Towards Goal  Goal: *STG: Decreased hallucinations  Outcome: Progressing Towards Goal  Goal: *STG: Absence of lethality  Outcome: Progressing Towards Goal  Goal: *STG: Demonstrates ability to understand and use improved judgment in daily activities and relationships  Outcome: Progressing Towards Goal  Goal: *LTG: Returns to baseline functioning  Outcome: Progressing Towards Goal  Goal: Interventions  Outcome: Progressing Towards Goal     Problem: Patient Education: Go to Patient Education Activity  Goal: Patient/Family Education  Outcome: Progressing Towards Goal

## 2022-08-31 NOTE — ASSESSMENT & PLAN NOTE
-Possibly related to UTI and/or COVID infection  -CT head: No acute intracranial abnormalities are identified. No CT evidence to suggest acute intracranial hematoma, cortical infarct, or mass effect/mass lesion. No significant change.  Moderate diffuse volume loss with moderate white matter hypodensity likely  chronic microvascular ischemic disease  -Fall precautions

## 2022-08-31 NOTE — PROGRESS NOTES
0157- TRANSFER - IN REPORT:    Verbal report received from Mary Andres, Rn(name) on Cristela Lisa  being received from ED(unit) for routine progression of care      Report consisted of patients Situation, Background, Assessment and   Recommendations(SBAR). Information from the following report(s) SBAR, Kardex, ED Summary, Intake/Output, MAR, Accordion, and Recent Results was reviewed with the receiving nurse. Opportunity for questions and clarification was provided. 9369- Patient arrived to unit via stretcher accompanied by nursing supervisor, on telemetry. Assessment completed upon patients arrival to unit and care assumed. Patient unable to follow commands or verbally respond to this RN. Admission questions unable to be obtained at this time due to patients AMS. Patient transitioned to ICU bed #7 without difficulty. Multiple bruises seen throughout body, abrasion to forehead. Patient found to have 101.3F fever upon arrival, PRN tylenol suppository to be given. Pupils sluggish, reactive, equal. Patient becomes quite agitated thoughout assessment. Patient found to be incontinent of urine at this time, patient cleaned, new brief applied at this time. Negative pressure injuries noted. Bed in lowest and locked position, CBWR, bed alarm on, all other VSS.     0320- Patient pulling off telemetry monitors. This RN to bedside, tele leads replaced. 4072- Patient pulled off telemetry leads, this RN to bedside, tele leads replaced. Patient remains confused, unable to follow commands. 7706- Patient attempting to get OOB, this RN to bedside. Education provided at this time regarding patient safety, no evidence of understanding. Bed alarm remains on. Posey restraint may benefit patient d/t DROPLET PLUS isolation for COVID (+) infection, requiring PPE and door closed at all times, inhibiting bed alarm sound to nurses on unit. 8885- Patient pulling off telemetry leads.  Tele tech informed of patient intermittently off telemetry due to patient AMS, and isolation precautions. 3420- Patient continues to attempt to get OOB. -165bpm on monitor. Provider at bedside. New orders received for EKG    0538- RT at bedside, EKG obtained at this time. 7105- Provider to place new orders for HR control at this time. 80- New orders received for posey vest at this time. Wife, John Pagan aware and consents. 1636- Bedside shift change report given to Pk Mathews LPN (oncoming nurse) by Angie Rojas RN (offgoing nurse). Report included the following information SBAR, Kardex, ED Summary, Intake/Output, MAR, Accordion, Recent Results, and Cardiac Rhythm AFIB .

## 2022-08-31 NOTE — PROGRESS NOTES
Hold Heparin x 1 hour then decrease to 15 un/kg/hr per APTT results. Will notify night shift to allow heparin to remain on hold until after placement of PICC line.

## 2022-08-31 NOTE — ACP (ADVANCE CARE PLANNING)
Spoke with wife in terms of obtaining consent for midline/picc line insertion. Wife has agreed to consent. She is unsure of what the goals of care is for his case as of yet. She does not want him discharged back home. Will discuss about goals of care in the afternoon.

## 2022-08-31 NOTE — CONSULTS
[facility] CARDIOLOGY  CARDIOLOGY CONSULTATION    REASON FOR CONSULT: New afib, elevated troponin    REQUESTING PROVIDER: Jose Guzman NP    CHIEF COMPLAINT:  Fall, unable to obtain BP    HISTORY OF PRESENT ILLNESS:  Ehsan Barajas is a 79y.o. year-old male with past medical history significant for CVA, dementia, HTN,HLD, seizures, and carotid artery occlusion s/p TCAR on 8/15/22 who was seen today for evaluation of new afib and elevated troponin. Unable to obtain reliable history from patient due to memory impairment; information obtained from the medical record. The patient presented to the [facility] ER from [address] [facility] on 8/30/22 for fall and inability to obtain a BP reading. He was febrile the prior day and tested positive for Covid the day of presentation. In the ER he was hypotensive and in AF with mildly elevated rate which is new for him. He is being treated for sepsis, Covid, AF with RVR, ABBY, and altered mental status. Records from hospital admission course thus far reviewed. Telemetry reviewed. No events overnight. The patient is in afib with rate in the 100s. INPATIENT MEDICATIONS:  Home medications reviewed.     Current Facility-Administered Medications:     sodium chloride (NS) flush 5-40 mL, 5-40 mL, IntraVENous, Q8H, Jessica James NP, 10 mL at 08/31/22 0700    sodium chloride (NS) flush 5-40 mL, 5-40 mL, IntraVENous, PRN, Jessica James NP    acetaminophen (TYLENOL) tablet 650 mg, 650 mg, Oral, Q6H PRN **OR** acetaminophen (TYLENOL) suppository 650 mg, 650 mg, Rectal, Q6H PRN, Jessica James NP, 650 mg at 08/31/22 0325    polyethylene glycol (MIRALAX) packet 17 g, 17 g, Oral, DAILY PRN, Jessica James NP    ondansetron (ZOFRAN ODT) tablet 4 mg, 4 mg, Oral, Q8H PRN **OR** ondansetron (ZOFRAN) injection 4 mg, 4 mg, IntraVENous, Q6H PRN, Jessica James NP    0.9% sodium chloride infusion, 100 mL/hr, IntraVENous, CONTINUOUS, Jessica James NP, Last Rate: 100 mL/hr at 08/31/22 0325, 100 mL/hr at 08/31/22 0325    [Held by provider] amLODIPine (NORVASC) tablet 10 mg, 10 mg, Oral, DAILY, Chelsie Lucia NP    [facility] by provider] aspirin chewable tablet 81 mg, 81 mg, Oral, DAILY, Chelsie Lucia NP    [Held by provider] atorvastatin (LIPITOR) tablet 80 mg, 80 mg, Oral, QHS, Chelsie Lucia NP    [Held by provider] clopidogreL (PLAVIX) tablet 75 mg, 75 mg, Oral, DAILY, Chelsie Lucia NP    heparin 25,000 units in D5W 250 ml infusion, 18-36 Units/kg/hr, IntraVENous, TITRATE, Aly Blas MD, Last Rate: 13.1 mL/hr at 08/31/22 1059, 18 Units/kg/hr at 08/31/22 1059    levETIRAcetam (KEPPRA) 1,000 mg in 0.9% sodium chloride 100 mL IVPB, 1,000 mg, IntraVENous, Q12H, Aly Blas MD    pantoprazole (PROTONIX) 40 mg in 0.9% sodium chloride 10 mL injection, 40 mg, IntraVENous, DAILY, Aly Blas MD    hydrALAZINE (APRESOLINE) 20 mg/mL injection 20 mg, 20 mg, IntraVENous, Q6H PRN, Aly Blas MD     ALLERGIES:  Allergies reviewed with the patient,  Allergies   Allergen Reactions    Pollen Extracts Runny Nose    . FAMILY HISTORY:  Family history reviewed. SOCIAL HISTORY:  Notable for unknown tobacco use, no alcohol use, and no illicit drug use. REVIEW OF SYSTEMS:  Complete review of systems performed, pertinents noted above, all other systems are negative. PHYSICAL EXAMINATION:  Vital sign assessment reveal a blood pressure of 144/72 and pulse rate of 88. Cardiovascular exam has a heart with an irregularly irregular rhythm on telemetry and mildly elevated rate. Respiratory exam reveals he is on room air. Neurologic exam is notable for altered mental status. Recent labs results and imaging reviewed.   Notable findings include:   Recent Results (from the past 24 hour(s))   CBC WITH AUTOMATED DIFF    Collection Time: 08/30/22  5:00 PM   Result Value Ref Range    WBC 9.6 4.6 - 13.2 K/uL    RBC 3.97 (L) 4.35 - 5.65 M/uL    HGB 13.0 13.0 - 16.0 g/dL    HCT 38.1 36.0 - 48.0 %    MCV 96.0 78.0 - 100.0 FL    MCH 32.7 24.0 - 34.0 PG    MCHC 34.1 31.0 - 37.0 g/dL    RDW 13.5 11.6 - 14.5 %    PLATELET 814 056 - 730 K/uL    MPV 11.4 9.2 - 11.8 FL    NRBC 0.0 0.0  WBC    ABSOLUTE NRBC 0.00 0.00 - 0.01 K/uL    NEUTROPHILS 92 (H) 40 - 73 %    BAND NEUTROPHILS 4 0 - 5 %    LYMPHOCYTES 2 (L) 21 - 52 %    MONOCYTES 1 (L) 3 - 10 %    EOSINOPHILS 0 0 - 5 %    BASOPHILS 1 0 - 2 %    IMMATURE GRANULOCYTES 0 %    ABS. NEUTROPHILS 9.2 (H) 1.8 - 8.0 K/UL    ABS. LYMPHOCYTES 0.2 (L) 0.9 - 3.6 K/UL    ABS. MONOCYTES 0.1 0.05 - 1.2 K/UL    ABS. EOSINOPHILS 0.0 0.0 - 0.4 K/UL    ABS. BASOPHILS 0.1 0.0 - 0.1 K/UL    ABS. IMM. GRANS. 0.0 K/UL    DF Manual      RBC COMMENTS Anisocytosis  1+       METABOLIC PANEL, COMPREHENSIVE    Collection Time: 08/30/22  5:00 PM   Result Value Ref Range    Sodium 141 136 - 145 mmol/L    Potassium 3.4 (L) 3.5 - 5.5 mmol/L    Chloride 104 100 - 111 mmol/L    CO2 23 21 - 32 mmol/L    Anion gap 14 3.0 - 18.0 mmol/L    Glucose 144 (H) 74 - 99 mg/dL    BUN 20 (H) 7 - 18 mg/dL    Creatinine 1.42 (H) 0.60 - 1.30 mg/dL    BUN/Creatinine ratio 14 12 - 20      GFR est AA 58 (L) >60 ml/min/1.73m2    GFR est non-AA 48 (L) >60 ml/min/1.73m2    Calcium 8.9 8.5 - 10.1 mg/dL    Bilirubin, total 1.0 0.2 - 1.0 mg/dL    AST (SGOT) 58 (H) 10 - 38 U/L    ALT (SGPT) 52 16 - 61 U/L    Alk.  phosphatase 92 45 - 117 U/L    Protein, total 7.3 6.4 - 8.2 g/dL    Albumin 3.4 3.4 - 5.0 g/dL    Globulin 3.9 2.0 - 4.0 g/dL    A-G Ratio 0.9 0.8 - 1.7     LACTIC ACID    Collection Time: 08/30/22  5:00 PM   Result Value Ref Range    Lactic acid 3.7 (HH) 0.4 - 2.0 mmol/L   MAGNESIUM    Collection Time: 08/30/22  5:00 PM   Result Value Ref Range    Magnesium 2.0 1.6 - 2.6 mg/dL   CULTURE, BLOOD    Collection Time: 08/30/22  5:00 PM    Specimen: Blood   Result Value Ref Range    Special Requests: No Special Requests      Culture result:        one of two bottles has been flagged positive by instrument. Bottle has been sent to [facility] laboratory to assess for possible growth. GROWTH OF GRAM POS COCCI IN CLUSTERS CALLED TO [facility] ICU AT 0607 2/48/14 MIAN TEMPLE      Culture result: O2 BOTTLE    TROPONIN-HIGH SENSITIVITY    Collection Time: 08/30/22  5:00 PM   Result Value Ref Range    Troponin-High Sensitivity 150 (HH) 0 - 78 ng/L   CULTURE, BLOOD    Collection Time: 08/30/22  7:00 PM    Specimen: Blood   Result Value Ref Range    Special Requests: No Special Requests      Culture result:        Single bottle drawn has been flagged positive by instumentation. Bottle has been sent to [facility] Laboratory for determination of possible growth.   GROWTH OF GRAM POS COCCI IN CLUSTERS CALLED TO [facility] ICU AT 8111 5/90/64 MIAN TEMPLE PED BOTTLE     EKG, 12 LEAD, INITIAL    Collection Time: 08/30/22  7:01 PM   Result Value Ref Range    Ventricular Rate 100 BPM    Atrial Rate 183 BPM    P-R Interval 132 ms    QRS Duration 135 ms    Q-T Interval 339 ms    QTC Calculation (Bezet) 438 ms    Calculated P Axis -37 degrees    Calculated R Axis 10 degrees    Calculated T Axis 28 degrees    Diagnosis       Sinus tachycardia  Multiple premature complexes, vent & supraven  Right bundle branch block  Baseline wander in lead(s) V3     URINALYSIS W/ RFLX MICROSCOPIC    Collection Time: 08/30/22  8:25 PM   Result Value Ref Range    Color Dark Yellow      Appearance Clear      Specific gravity 1.022 1.005 - 1.030      pH (UA) 5.5 5.0 - 8.0      Protein 300 (A) Negative mg/dL    Glucose Negative Negative mg/dL    Ketone 15 (A) Negative mg/dL    Bilirubin Negative Negative      Blood Small (A) Negative      Urobilinogen 0.2 0.2 - 1.0 EU/dL    Nitrites Negative Negative      Leukocyte Esterase Negative Negative     URINE MICROSCOPIC    Collection Time: 08/30/22  8:25 PM   Result Value Ref Range    WBC 0-4 0 - 4 /hpf    RBC 0-5 0 - 2 /hpf    Epithelial cells Few 0 - 20 /lpf    Bacteria 2+ (A) None /hpf Mixed Cell Cast 2-5 /lpf   LACTIC ACID    Collection Time: 08/30/22  8:30 PM   Result Value Ref Range    Lactic acid 3.8 (HH) 0.4 - 2.0 mmol/L   TROPONIN-HIGH SENSITIVITY    Collection Time: 08/30/22 11:05 PM   Result Value Ref Range    Troponin-High Sensitivity 170 (HH) 0 - 78 ng/L   METABOLIC PANEL, COMPREHENSIVE    Collection Time: 08/31/22  5:20 AM   Result Value Ref Range    Sodium 143 136 - 145 mmol/L    Potassium 2.6 (LL) 3.5 - 5.5 mmol/L    Chloride 110 100 - 111 mmol/L    CO2 24 21 - 32 mmol/L    Anion gap 9 3.0 - 18.0 mmol/L    Glucose 122 (H) 74 - 99 mg/dL    BUN 26 (H) 7 - 18 mg/dL    Creatinine 1.41 (H) 0.60 - 1.30 mg/dL    BUN/Creatinine ratio 18 12 - 20      GFR est AA 58 (L) >60 ml/min/1.73m2    GFR est non-AA 48 (L) >60 ml/min/1.73m2    Calcium 8.2 (L) 8.5 - 10.1 mg/dL    Bilirubin, total 0.7 0.2 - 1.0 mg/dL    AST (SGOT) 77 (H) 10 - 38 U/L    ALT (SGPT) 55 16 - 61 U/L    Alk. phosphatase 70 45 - 117 U/L    Protein, total 6.0 (L) 6.4 - 8.2 g/dL    Albumin 2.7 (L) 3.4 - 5.0 g/dL    Globulin 3.3 2.0 - 4.0 g/dL    A-G Ratio 0.8 0.8 - 1.7     MAGNESIUM    Collection Time: 08/31/22  5:20 AM   Result Value Ref Range    Magnesium 2.0 1.6 - 2.6 mg/dL   CBC WITH AUTOMATED DIFF    Collection Time: 08/31/22  5:20 AM   Result Value Ref Range    WBC 9.6 4.6 - 13.2 K/uL    RBC 3.66 (L) 4.35 - 5.65 M/uL    HGB 11.9 (L) 13.0 - 16.0 g/dL    HCT 34.6 (L) 36.0 - 48.0 %    MCV 94.5 78.0 - 100.0 FL    MCH 32.5 24.0 - 34.0 PG    MCHC 34.4 31.0 - 37.0 g/dL    RDW 13.6 11.6 - 14.5 %    PLATELET 826 836 - 379 K/uL    MPV 11.0 9.2 - 11.8 FL    NRBC 0.0 0.0  WBC    ABSOLUTE NRBC 0.00 0.00 - 0.01 K/uL    NEUTROPHILS 82 (H) 40 - 73 %    BAND NEUTROPHILS 17 (H) 0 - 5 %    LYMPHOCYTES 1 (L) 21 - 52 %    MONOCYTES 0 (L) 3 - 10 %    EOSINOPHILS 0 0 - 5 %    BASOPHILS 0 0 - 2 %    IMMATURE GRANULOCYTES 0 %    ABS. NEUTROPHILS 9.5 (H) 1.8 - 8.0 K/UL    ABS. LYMPHOCYTES 0.1 (L) 0.9 - 3.6 K/UL    ABS.  MONOCYTES 0.0 (L) 0.05 - 1.2 K/UL    ABS. EOSINOPHILS 0.0 0.0 - 0.4 K/UL    ABS. BASOPHILS 0.0 0.0 - 0.1 K/UL    ABS. IMM.  GRANS. 0.0 K/UL    DF AUTOMATED      RBC COMMENTS Normocytic, Normochromic     TROPONIN-HIGH SENSITIVITY    Collection Time: 08/31/22  5:20 AM   Result Value Ref Range    Troponin-High Sensitivity 208 (HH) 0 - 78 ng/L   EKG, 12 LEAD, SUBSEQUENT    Collection Time: 08/31/22  5:36 AM   Result Value Ref Range    Ventricular Rate 109 BPM    QRS Duration 130 ms    Q-T Interval 356 ms    QTC Calculation (Bezet) 479 ms    Calculated R Axis 6 degrees    Calculated T Axis 21 degrees    Diagnosis       Atrial fibrillation with rapid ventricular response  Right bundle branch block  Abnormal ECG  When compared with ECG of 30-AUG-2022 19:02,  PREVIOUS ECG IS PRESENT     COVID-19 RAPID TEST    Collection Time: 08/31/22  9:40 AM   Result Value Ref Range    COVID-19 rapid test DETECTED (A) Not Detected     ECHO ADULT FOLLOW-UP OR LIMITED    Collection Time: 08/31/22  9:58 AM   Result Value Ref Range    LA Major Fisher 5.3 cm    LA Area 4C 21.3 cm2    LA Diameter 3.9 cm    Aortic Root 3.5 cm    Ascending Aorta 3.3 cm    IVSd 1.3 (A) 0.6 - 1.0 cm    LVIDd 4.7 4.2 - 5.9 cm    LVIDs 3.3 cm    LVOT Diameter 2.2 cm    LVPWd 1.2 (A) 0.6 - 1.0 cm    LVOT Area 3.8 cm2    RV Basal Dimension 3.4 cm    RV Longitudinal Dimension 5.9 cm    RV Mid Dimension 2.8 cm    Fractional Shortening 2D 30 28 - 44 %    LVIDd Index 2.55 cm/m2    LVIDs Index 1.79 cm/m2    LV RWT Ratio 0.51     LV Mass 2D 224.8 (A) 88 - 224 g    LV Mass 2D Index 122.2 (A) 49 - 115 g/m2    LA Size Index 2.12 cm/m2    LA/AO Root Ratio 1.11     Ao Root Index 1.90 cm/m2    Ascending Aorta Index 1.79 cm/m2   TROPONIN-HIGH SENSITIVITY    Collection Time: 08/31/22 10:56 AM   Result Value Ref Range    Troponin-High Sensitivity 143 (HH) 0 - 78 ng/L   PTT    Collection Time: 08/31/22 10:56 AM   Result Value Ref Range    aPTT 30.5 23.0 - 36.4 sec    aPTT, therapeutic range   82 - 109 sec AMMONIA    Collection Time: 08/31/22 10:56 AM   Result Value Ref Range    Ammonia, plasma 25 11 - 32 umol/L       Discussed case with Dr. Pradeep Solano, and our impression and recommendations are as follows:  Atrial fibrillation with RVR  New onset, rate in the 100s-110s  Recommend to use IV metoprolol or IV diltiazem until swallowing is assessed  He is on heparin drip  Keep serum potassium between 4-5 and serum magnesium > 2. Continue telemetry monitoring  Echo is pending    HTN  Blood pressures are at goal  Monitor closely    Carotid occlusion with thrombus  S/p TCAR on 8/15/22  Was discharged from [facility] on aspirin, Brilinta, and statin  Aspirin and Brilinta are on hold until swallowing can be assessed; currently on heparin drip  If oral anticoagulation is used, will need to determine from his neurosurgeon which antiplatelet may be discontinued Jose Castaneda MD)      Thank you for involving us in the care of this patient. Please do not hesitate to call me or Dr. Pradeep Solano if additional questions arise.

## 2022-08-31 NOTE — ROUTINE PROCESS
TRANSFER - OUT REPORT:    Verbal report given to Gabriel Torres RN(name) on Max Merrill  being transferred to ICU(unit) for routine progression of care       Report consisted of patients Situation, Background, Assessment and   Recommendations(SBAR). Information from the following report(s) ED Summary was reviewed with the receiving nurse. Lines:   Peripheral IV 08/30/22 Left Antecubital (Active)   Site Assessment Clean, dry, & intact 08/30/22 2130   Phlebitis Assessment 0 08/30/22 2130   Infiltration Assessment 0 08/30/22 2130   Dressing Status Clean, dry, & intact 08/30/22 2130   Dressing Type Transparent 08/30/22 2130   Hub Color/Line Status Pink;Patent; Flushed 08/30/22 2130        Opportunity for questions and clarification was provided.       Patient transported with:   Monitor  Registered Nurse

## 2022-08-31 NOTE — ED NOTES
Patient resting on stretcher pulling at cardiac monitor leads and pulse ox. Patient confused and moans out on occasion. No distress noted. Will continue to monitor.

## 2022-08-31 NOTE — PROGRESS NOTES
Spoke w/wife who now agrees to PICC/Midline placement s/p her talking w/Dr Lizzeth Marr. Will notify CT and MRI.

## 2022-08-31 NOTE — ASSESSMENT & PLAN NOTE
-HR liabile, ranging from 110s to 160s. EKG showed Atrial fibrillation with RVR.  Sinus tach in ED  -Initial troponin 150, then 170 and 208  -Lopressor 5mg x1 now  -Cardiology input greatly appreciated

## 2022-08-31 NOTE — PROGRESS NOTES
Patient received in sign out from Dr. Beau Higuera.  Spoke with PM Hospitalist who accepts the patient for inpatient admission to a telemetry bed

## 2022-09-01 NOTE — ACP (ADVANCE CARE PLANNING)
Updated wife by phone, NP Bibiana present for call. Informed about + bld cult, agitation, and blue toe. She was very clear no further surgerys, invasive procedures, cpr, intubation. After these points were clarified which did take a few explanations to ensure all parties were on the same page. She had more difficults with understanding the concept of comfort care. I made recommendation that we cont to treat with abx and blood thinners for now and see if he improves. We will also utilize small doses ativan/morphine, but he is not yet comfort care. At end of phone call, she requested to speak to Baudilio Cavazos, the ED manager, that she knows personally to help her understand things furtehr I suspect. Will change plans as per her wishes if needed going forward. She is sure he doesn't want to live the rest of his life in a nursing home. Total acp time 20 mins.

## 2022-09-01 NOTE — PROGRESS NOTES
1950- PICC team on unit. Discuss with primary RN procedure, orders for PICC placement confirmed. 2006- PICC nurse at bedside. Proper PPE applied prior to entering patient room, ICU #7. VSS HR 99, RR 19, 94% on RA, 105/64. Time out occurred at this time, patient, placement, procedure and time confirmed at this time. This RN assist at bedside. 2029- Patient tolerated well, double lumen PICC placed to E. 2040-- Heparin gtt restarted at this time, aPTT orders placed x6hrs.     2205- Patient extremely restless in bed, continually pulling on tubes, lines, gown, tele leads, briefs, moaning however unable to verbalize why he is moaning. Re-directional efforts unsuccessful. Patient remains in [address] vest.     1018- This RN to bedside, as patient has pulled off telemetry leads, briefs, and found to have left leg over side rail. Patient found to be incontinent of urine, and PIV accidentally removed by patient. Patient cleaned, new gown, briefs, bed pad and tele leads applied at this time. Patient extremely agitated, cursing at nursing staff, violent towards nursing staff, unable to be redirected at this time. PIV removed, pressure applied x7min for blood occlusion as patient is on heparin gtt. 0118- Xray results reveal PICC line inserted in appropriate place, okay to use. Fluids and heparin gtt now running though PICC line. aPTT to be drawn within the hour. New orders received at this time for b/l mitt restraints to be used for patient safety, as patient continues to pull at PICC line while on heparin. 9120- Call to provider at this time, in regards to patient heparin gtt. Patient heparin gtt stopped by day shift nurse as per protocol in response to aPTT results. Protocol states to stop heparin gtt x 1hr, however, gtt stopped x 3hrs in anticipation of PICC line being placed. Heparin gtt restarted post PICC placement at reduced rate as per heparin protocol. Scheduled aPTT redraw 6hrs post rate change. Results indicate 40u/kg bolus AND increase infusion x 2u/kg/hr. New orders received at this time to continue at current rate and to recheck aPTT at 0800 today. 5- RN to bedside, patient briefs clean and dry at this time. Patient remains altered, primarily moaning. Vest and b/l mitt restraints remain necessary due to intermittent pulling on tubes, lines, and attempting to get OOB.

## 2022-09-01 NOTE — PROGRESS NOTES
.Bedside shift change report given to AURY Oneil RN (oncoming nurse) by (offgoing nurse) AURY Ellis LPN Report included the following information SBAR, Kardex, Intake/Output, MAR, Recent Results, and Quality Measures.

## 2022-09-01 NOTE — PROGRESS NOTES
Care Management Interventions  PCP Verified by CM: Yes  Last Visit to PCP: 01/14/19  Palliative Care Criteria Met (RRAT>21 & CHF Dx)?: No  Transition of Care Consult (CM Consult): Discharge Planning  Physical Therapy Consult: No  Speech Therapy Consult: Yes  Support Systems: Spouse/Significant Other  Confirm Follow Up Transport: Family  The Plan for Transition of Care is Related to the Following Treatment Goals : Patient centered discharge planning to unsure smooth transition to community and PLOF. Discharge Location  Patient Expects to be Discharged to[de-identified] Home    Reason for Admission:    Chart reviewed, unable to interview pt therefor wife called and did not reach, message left. Chart shows that pt presented to ED from [address] [place] [address] recently tested positive for COVID, pt taken by EMS after BP could not be obtained. BP 98/40 upon arrival to ED and confused which per wife is not baseline. Hospitalist consulted for admission. RUR Score:   19%               PCP: First and Last name:   Allie Aguilar MD     Name of Practice:    Are you a current patient: Yes/No:    Approximate date of last visit: 1-14-22   Can you participate in a virtual visit if needed:     Do you (patient/family) have any concerns for transition/discharge? Plan for utilizing home health:   TBD    Current Advanced Directive/Advance Care Plan:  DNR      Healthcare Decision Maker:   Click here to complete [address] including selection of the Healthcare Decision Maker Relationship (ie \"Primary\")            Primary Decision MakerSandervicki Garciaor - [phone]    Secondary Decision Maker: Deya Crowe  [phone]    Transition of Care Plan:    Plan of care includes patient/primary care giver interview to develop discharge plan, consider home health, and follow up appointment to be arranged by nursing staff at discharge.   Patient centered discharge planning to ensure smooth transition to community and OF. Pt resides at [place] [id number]. CM will follow for DC needs.

## 2022-09-01 NOTE — ANTIMICROBIAL STEWARDSHIP
Pharmacy Dosing Services: Vancomycin    Indication: Bloodstream Infection    Day of therapy: 0 / 5    Other Antimicrobials (Include dose, start day & day of therapy):  n/a  Current Antimicrobial Therapy (168h ago, onward)       Ordered     Start Stop    22 1355  VANCOMYCIN INFORMATION NOTE  Other,   ONCE        References:    [facility]    22 0200 22 1359    22 1355  vancomycin (VANCOCIN) 750 mg in 0.9% sodium chloride 250 mL ([id number])  750 mg,   IntraVENous,   EVERY 12 HOURS        References:    [facility]    22 0300 22 0259    22 1355  vancomycin (VANCOCIN) 1,250 mg in 0.9% sodium chloride 250 mL ([id number])  1,250 mg,   IntraVENous,   ONCE        References:    [facility]    22 1500 22 0259    22 1355  VANCOMYCIN INFORMATION NOTE  Other,   RX DOSING/MONITORING        References:    [facility]    [facility]4 --                    Loading dose (date given): 1250 mg  Current Maintenance dose: 750 mg IV every 12 hours    Goal Vancomycin Level: AUC24 (range): 400-600 mg/L.hr    Vancomycin Level (if drawn): No results for input(s): Jewelene Caulk in the last 72 hours. Pt Weight Weight: 73.1 kg (161 lb 3.2 oz)   Temperature Temp: (!) 96.7 °F (35.9 °C)   Temp (72hrs), Av.4 °F (36.9 °C), Min:96.6 °F (35.9 °C), Max:102.7 °F (39.3 °C)     HR Pulse (Heart Rate): 100   BP BP: (!) 173/80       Recent Labs     22  0215 22  0520 22  1700   CREA 0.80 1.41* 1.42*   BUN 24* 26* 20*   WBC 7.4 9.6 9.6     Estimated Creatinine Clearance Estimated Creatinine Clearance: 65.4 mL/min (based on SCr of 0.8 mg/dL).   Estimated Creatinine Clearance (using IBW): 65.4 mL/min     CAPD, Hemodialysis or Renal Replacement Therapy: N/A  Renal function stable? (unstable defined as SCr increase of 0.5 mg/dL or > 50% increase from baseline, whichever is greater) (Y/N): y     Significant Cultures:   Results       Procedure Component Value Units Date/Time    CULTURE, URINE [[id number]] Collected: 09/01/22 0935    Order Status: Sent Specimen: Urine Updated: 09/01/22 1103    COVID-19 RAPID TEST [[id number]]  (Abnormal) Collected: 08/31/22 0940    Order Status: Completed Specimen: Nasopharyngeal Updated: 08/31/22 1027     COVID-19 rapid test DETECTED        Comment: CALLED TO AND READ BACK BY GEMINI STONE RN AT 1024 8/31/22 MIAN TEMPLE   The specimen is POSITIVE for SARS-CoV-2, the novel coronavirus associated with COVID-19. This test has been authorized by the FDA under an Emergency Use Authorization (EUA) for use by authorized laboratories. Fact sheet for Healthcare Providers: ConventionUpdate.co.nz Fact sheet for Patients: ConventionUpdate.co.nz   Methodology: Isothermal Nucleic Acid Amplification       CULTURE, BLOOD [[id number]] Collected: 08/30/22 1900    Order Status: Completed Specimen: Blood Updated: 09/01/22 1314     Special Requests: No Special Requests        Culture result:       Single bottle drawn has been flagged positive by instumentation. Bottle has been sent to [facility] for determination of possible growth. GROWTH OF GRAM POS COCCI IN CLUSTERS CALLED TO R [address] [facility] ICU AT 1668 5/84/95 J  WINDY PED BOTTLE              Probable Staphylococcus aureus growing in 1 of 1 bottles drawn SITE= L. HAND            (NOTE) G+C IN CLUSTERS CALLED TO GEMINI STONE RN AT 1011 8/31/22 BY [facility]    BLOOD CULTURE ID PANEL [[id number]] Collected: 08/30/22 1900    Order Status: No result Specimen: Blood Updated: 08/31/22 1025    CULTURE, BLOOD [[id number]] Collected: 08/30/22 1700    Order Status: Completed Specimen: Blood Updated: 09/01/22 1315     Special Requests: No Special Requests        Culture result:       one of two bottles has been flagged positive by instrument. Bottle has been sent to [facility] to assess for possible growth.   GROWTH OF GRAM POS COCCI IN CLUSTERS CALLED TO R [address] [facility] ICU AT 5585 3/68/20 J  WINDY  O2 BOTTLE              Probable Staphylococcus aureus growing in 1 of 2 bottles drawn SITE = RAC            (NOTE) G+C IN CLUSTERS CALLED TO GEMINI STONE RN AT 1011 8/31/22 BY [facility]    BLOOD CULTURE ID PANEL [[id number]]  (Abnormal) Collected: 08/30/22 1700    Order Status: Completed Specimen: Blood Updated: 09/01/22 1332     Acc. no. from Micro Order [id number]     Enterococcus faecalis Not detected     Enterococcus faecium Not detected     Listeria monocytogenes Not detected     Staphylococcus Detected        Staphylococcus aureus Detected        Staph epidermidis Not detected     Staph lugdunensis Not detected     Streptococcus Not detected     Streptococcus agalactiae (Group B) Not detected     Streptococcus pneumoniae Not detected     Streptococcus pyogenes (Group A) Not detected     Acinetobacter calcoaceticus-baumanii complex Not detected     Bacteroides fragilis Not detected     Enterobacterales species Not detected     Enterobacter cloacae complex Not detected     Escherichia coli Not detected     Klebsiella aerogenes Not detected     Klebsiella oxytoca Not detected     Klebsiella pneumoniae group Not detected     Proteus Not detected     Salmonella Not detected     Serratia marcescens Not detected     Haemophilus influenzae Not detected     Neisseria meningitidis Not detected     Pseudomonas aeruginosa Not detected     Steno maltophilia Not detected     Candida albicans Not detected     Candida auris Not detected     Candida glabrata Not detected     Candida krusei Not detected     Candida parapsilosis Not detected     Candida tropicalis Not detected     Crypto neoformans/gattii Not detected     RESISTANT GENES:          mecA/C and MREJ (Methicillin resistant gene) Not detected     Comment False positive results may rarely occur.  Correlate with     Comment: clinical,epidemiologic,  and other laboratory findings  Please see BCID Interpretation Guide in EPIC Links         COVID-19 (INPATIENT TESTING) [[id number]] Collected: 08/19/22 1518    Order Status: Completed Specimen: NASOPHARYNGEAL SWAB Updated: 08/19/22 1806     COVID-19 NEGATIVE        Comment: Negative results do not preclude SARS-CoV-2 infection and should not be used as the sole basis  for treatment or other patient management decisions. Negative results must be combined with  clinical observations, patient history, and epidemiological information. Testing with the Xpert Xpress SARS-CoV-2 test is intended for use by trained operators who are  proficient in performing tests using either [employer], [employer] and/or [employer]. The Xpert Xpress SARS-CoV-2 test is only for use under the Food and Drug  Administration's Emergency Use Authorization. Regimen assessment: New start  Maintenance dose: 750 mg IV every 12 hours  Next scheduled level: 9/3 at . Naida  will follow daily and adjust medications as appropriate for renal function and/or serum levels.     Thank you,  Areta Klinefelter, [place]  Pharmacist  [phone]

## 2022-09-01 NOTE — PROGRESS NOTES
T&P for comfort and pressure relief. Cleansed of incontinent urine. Thorough oral care provided w/large amount of dry skin cleared from gums, inside lips and roof of mouth. IVF, ABT and Heparin continue w/o difficulty. Restraint protocol continues. Will continue to monitor.

## 2022-09-01 NOTE — PROGRESS NOTES
0700-Received care of pt. Pt resting in bed with eyes closed at this time. Heparin drip infusing at 15units/kg/hr at this time. NS infusing at 100ml/hr. Vest restraint and mittens in place bilaterally. Pt disoriented X4. Bed in lowest position, wheels locked, call bell within reach.

## 2022-09-01 NOTE — PROGRESS NOTES
ST attempted evaluation, however, patient with altered mental status and unable to participate. Patient unable to follow simple commands for oral exam or to clear throat. Patient given one ice chip, however, did not make any attempts to swallow. Patient with wet vocal quality following ice chip. ST will re attempt when mentation improves.      Meghan Gibbs, [place], [address] [facility]

## 2022-09-01 NOTE — PROGRESS NOTES
1950- PICC team on unit. Discuss with primary RN procedure, orders for PICC placement confirmed. 2006- PICC nurse at bedside. Proper PPE applied prior to entering patient room, ICU #7. VSS HR 99, RR 19, 94% on RA, 105/64. Time out occurred at this time, patient, placement, procedure and time confirmed at this time. This RN assist at bedside. 2029- Patient tolerated well, double lumen PICC placed to RUE. 2040-- Heparin gtt restarted at this time, aPTT orders placed x6hrs.     2205- Patient extremely restless in bed, continually pulling on tubes, lines, gown, tele leads, briefs, moaning however unable to verbalize why he is moaning. Re-directional efforts unsuccessful. Patient remains in [address] vest.     3265- This RN to bedside, as patient has pulled off telemetry leads, briefs, and found to have left leg over side rail. Patient found to be incontinent of urine, and PIV accidentally removed by patient. Patient cleaned, new gown, briefs, bed pad and tele leads applied at this time. Patient extremely agitated, cursing at nursing staff, violent towards nursing staff, unable to be redirected at this time. PIV removed, pressure applied x7min for blood occlusion as patient is on heparin gtt. 0118- Xray results reveal PICC line inserted in appropriate place, okay to use. Fluids and heparin gtt now running though PICC line. aPTT to be drawn within the hour. New orders received at this time for b/l mitt restraints to be used for patient safety, as patient continues to pull at PICC line while on heparin. 4581- Call to provider at this time, in regards to patient heparin gtt. Patient heparin gtt stopped by day shift nurse as per protocol in response to aPTT results. Protocol states to stop heparin gtt x 1hr, however, gtt stopped x 3hrs in anticipation of PICC line being placed. Heparin gtt restarted post PICC placement at reduced rate as per heparin protocol. Scheduled aPTT redraw 6hrs post rate change. Results indicate 40u/kg bolus AND increase infusion x 2u/kg/hr. New orders received at this time to continue at current rate and to recheck aPTT at 0800 today.

## 2022-09-01 NOTE — PROGRESS NOTES
Problem: Pressure Injury - Risk of  Goal: *Prevention of pressure injury  Description: Document Yony Scale and appropriate interventions in the flowsheet. Outcome: Progressing Towards Goal  Note: Pressure Injury Interventions:  Sensory Interventions: Assess changes in LOC, Keep linens dry and wrinkle-free, Minimize linen layers, Monitor skin under medical devices, Pressure redistribution bed/mattress (bed type), Check visual cues for pain, Avoid rigorous massage over bony prominences    Moisture Interventions: Absorbent underpads, Apply protective barrier, creams and emollients, Internal/External urinary devices, Limit adult briefs, Maintain skin hydration (lotion/cream), Minimize layers    Activity Interventions: Pressure redistribution bed/mattress(bed type)    Mobility Interventions: HOB 30 degrees or less, Pressure redistribution bed/mattress (bed type)    Nutrition Interventions: Document food/fluid/supplement intake, Discuss nutritional consult with provider    Friction and Shear Interventions: Minimize layers                Problem: Patient Education: Go to Patient Education Activity  Goal: Patient/Family Education  Outcome: Progressing Towards Goal     Problem: Falls - Risk of  Goal: *Absence of Falls  Description: Document Yang Fall Risk and appropriate interventions in the flowsheet.   Outcome: Progressing Towards Goal  Note: Fall Risk Interventions:       Mentation Interventions: Bed/chair exit alarm, Adequate sleep, hydration, pain control, Room close to nurse's station, Update white board    Medication Interventions: Bed/chair exit alarm    Elimination Interventions: Bed/chair exit alarm    History of Falls Interventions: Bed/chair exit alarm, Room close to nurse's station, Vital signs minimum Q4HRs X 24 hrs (comment for end date)         Problem: Patient Education: Go to Patient Education Activity  Goal: Patient/Family Education  Outcome: Progressing Towards Goal     Problem: Pain  Goal: *Control of Pain  Outcome: Progressing Towards Goal  Goal: *PALLIATIVE CARE:  Alleviation of Pain  Outcome: Progressing Towards Goal     Problem: Patient Education: Go to Patient Education Activity  Goal: Patient/Family Education  Outcome: Progressing Towards Goal     Problem: General Medical Care Plan  Goal: *Vital signs within specified parameters  Outcome: Progressing Towards Goal  Goal: *Labs within defined limits  Outcome: Progressing Towards Goal  Goal: *Absence of infection signs and symptoms  Outcome: Progressing Towards Goal  Goal: *Optimal pain control at patient's stated goal  Outcome: Progressing Towards Goal  Goal: *Skin integrity maintained  Outcome: Progressing Towards Goal  Goal: *Fluid volume balance  Outcome: Progressing Towards Goal  Goal: *Optimize nutritional status  Outcome: Progressing Towards Goal  Goal: *Anxiety reduced or absent  Outcome: Progressing Towards Goal  Goal: *Progressive mobility and function (eg: ADL's)  Outcome: Progressing Towards Goal     Problem: Patient Education: Go to Patient Education Activity  Goal: Patient/Family Education  Outcome: Progressing Towards Goal     Problem: Altered Thought Process (Adult/Pediatric)  Goal: *STG: Participates in treatment plan  Outcome: Progressing Towards Goal  Goal: *STG: Remains safe in hospital  Outcome: Progressing Towards Goal  Goal: *STG: Seeks staff when feelings of anxiety and fear arise  Outcome: Progressing Towards Goal  Goal: *STG: Complies with medication therapy  Outcome: Progressing Towards Goal  Goal: *STG: Attends activities and groups  Outcome: Progressing Towards Goal  Goal: *STG: Decreased delusional thinking  Outcome: Progressing Towards Goal  Goal: *STG: Decreased hallucinations  Outcome: Progressing Towards Goal  Goal: *STG: Absence of lethality  Outcome: Progressing Towards Goal  Goal: *STG: Demonstrates ability to understand and use improved judgment in daily activities and relationships  Outcome: Progressing Towards Goal  Goal: *LTG: Returns to baseline functioning  Outcome: Progressing Towards Goal  Goal: Interventions  Outcome: Progressing Towards Goal     Problem: Patient Education: Go to Patient Education Activity  Goal: Patient/Family Education  Outcome: Progressing Towards Goal     Problem: Non-Violent Restraints  Goal: Removal from restraints as soon as assessed to be safe  Outcome: Progressing Towards Goal  Goal: No harm/injury to patient while restraints in use  Outcome: Progressing Towards Goal  Goal: Patient's dignity will be maintained  Outcome: Progressing Towards Goal  Goal: Patient Interventions  Outcome: Progressing Towards Goal     Problem: Airway Clearance - Ineffective  Goal: Achieve or maintain patent airway  Outcome: Progressing Towards Goal     Problem: Gas Exchange - Impaired  Goal: Absence of hypoxia  Outcome: Progressing Towards Goal  Goal: Promote optimal lung function  Outcome: Progressing Towards Goal     Problem: Breathing Pattern - Ineffective  Goal: Ability to achieve and maintain a regular respiratory rate  Outcome: Progressing Towards Goal     Problem:  Body Temperature -  Risk of, Imbalanced  Goal: Ability to maintain a body temperature within defined limits  Outcome: Progressing Towards Goal  Goal: Will regain or maintain usual level of consciousness  Outcome: Progressing Towards Goal  Goal: Complications related to the disease process, condition or treatment will be avoided or minimized  Outcome: Progressing Towards Goal     Problem: Isolation Precautions - Risk of Spread of Infection  Goal: Prevent transmission of infectious organism to others  Outcome: Progressing Towards Goal     Problem: Nutrition Deficits  Goal: Optimize nutrtional status  Outcome: Progressing Towards Goal     Problem: Risk for Fluid Volume Deficit  Goal: Maintain normal heart rhythm  Outcome: Progressing Towards Goal  Goal: Maintain absence of muscle cramping  Outcome: Progressing Towards Goal  Goal: Maintain normal serum potassium, sodium, calcium, phosphorus, and pH  Outcome: Progressing Towards Goal     Problem: Loneliness or Risk for Loneliness  Goal: Demonstrate positive use of time alone when socialization is not possible  Outcome: Progressing Towards Goal     Problem: Fatigue  Goal: Verbalize increase energy and improved vitality  Outcome: Progressing Towards Goal     Problem: Patient Education: Go to Patient Education Activity  Goal: Patient/Family Education  Outcome: Progressing Towards Goal

## 2022-09-01 NOTE — PROGRESS NOTES
Physician Progress Note      Raudel Olivier  CSN #:                  [id number]  :                       1940  ADMIT DATE:       2022 4:26 PM  [address] [place] DATE:  RESPONDING  PROVIDER #:        Cori Hester MD          QUERY TEXT:    Dear Hospitalist  Pt admitted with Sepsis. Pt noted to have  AMS. If possible, please document in the progress notes and discharge summary if you are evaluating and / or treating any of the following: The medical record reflects the following:  Risk Factors: NH  resident ;  COVID; sepsis; dementia; fall at   [facility];  Clinical Indicators: per NH- . Has been having fever and increasing lethargy in NH for past two days. staff could not  arouse him ,  sent to the ED. He was allegedly alert and able to maintain conversation 1 week ago. per ER  note-  Pt is altered, alert x 1, however [address] staff reported to EMS that pt was alert and oriented x 4 \"last week\"  temp  to  101.7; ABBY   ;  UTI  Treatment: CT head- nothing acute ;  IV rocephin   q24h    Thank you,   Octavio MASTS  [email or web address]  Options provided:  -- Metabolic encephalopathy  -- Septic encephalopathy  -- Other - I will add my own diagnosis  -- Disagree - Not applicable / Not valid  -- Disagree - Clinically unable to determine / Unknown  -- Refer to Clinical Documentation Reviewer    PROVIDER RESPONSE TEXT:    This patient has metabolic encephalopathy.     Query created by: Chiquita Greene on 2022 11:22 AM      Electronically signed by:  Cori Hester MD 2022 1:04 PM

## 2022-09-01 NOTE — PROGRESS NOTES
Comprehensive Nutrition Assessment    Type and Reason for Visit: Initial    Nutrition Recommendations/Plan:   NPO      Malnutrition Assessment:  Malnutrition Status: At risk for malnutrition (specify) (AMS, NPO possible comfort care) (09/01/22 1438)    Context:  Acute illness     Findings of the 6 clinical characteristics of malnutrition:   Energy Intake:  Mild decrease in energy intake (specify) (NPO)  Weight Loss:  Unable to assess     Body Fat Loss:  Unable to assess,     Muscle Mass Loss:  Unable to assess,    Fluid Accumulation:  Unable to assess,     Strength:  Not performed     Nutrition Assessment:    78 yo male PMH: dementia, dyslipidemia, HTN, seizure    Nutrition Related Findings:    Overweight BMI 25.3 at 161 lbs. Pt [facility] due to AMS pt is positive for COVID and has UTI. Being treated with IV Abx. Wife made clear wants no invasive procedures. Pt is not comfort care but if no improvement likely will convert. Pt reamins NPO x 2 days now due to unresponsiveness on admission and continues AMS. Wound Type: None    Recent Results (from the past 24 hour(s))   PTT    Collection Time: 08/31/22  4:50 PM   Result Value Ref Range    aPTT 167.2 (HH) 23.0 - 36.4 sec    aPTT, therapeutic range   82 - 164 sec   METABOLIC PANEL, COMPREHENSIVE    Collection Time: 09/01/22  2:15 AM   Result Value Ref Range    Sodium 146 (H) 136 - 145 mmol/L    Potassium 3.4 (L) 3.5 - 5.5 mmol/L    Chloride 117 (H) 100 - 111 mmol/L    CO2 23 21 - 32 mmol/L    Anion gap 6 3.0 - 18.0 mmol/L    Glucose 106 (H) 74 - 99 mg/dL    BUN 24 (H) 7 - 18 mg/dL    Creatinine 0.80 0.60 - 1.30 mg/dL    BUN/Creatinine ratio 30 (H) 12 - 20      GFR est AA >60 >60 ml/min/1.73m2    GFR est non-AA >60 >60 ml/min/1.73m2    Calcium 7.7 (L) 8.5 - 10.1 mg/dL    Bilirubin, total 0.9 0.2 - 1.0 mg/dL    AST (SGOT) 91 (H) 10 - 38 U/L    ALT (SGPT) 62 (H) 16 - 61 U/L    Alk.  phosphatase 63 45 - 117 U/L    Protein, total 5.6 (L) 6.4 - 8.2 g/dL Albumin 2.5 (L) 3.4 - 5.0 g/dL    Globulin 3.1 2.0 - 4.0 g/dL    A-G Ratio 0.8 0.8 - 1.7     CBC WITH AUTOMATED DIFF    Collection Time: 09/01/22  2:15 AM   Result Value Ref Range    WBC 7.4 4.6 - 13.2 K/uL    RBC 3.43 (L) 4.35 - 5.65 M/uL    HGB 11.2 (L) 13.0 - 16.0 g/dL    HCT 32.4 (L) 36.0 - 48.0 %    MCV 94.5 78.0 - 100.0 FL    MCH 32.7 24.0 - 34.0 PG    MCHC 34.6 31.0 - 37.0 g/dL    RDW 13.9 11.6 - 14.5 %    PLATELET 794 (L) 794 - 420 K/uL    MPV 12.2 (H) 9.2 - 11.8 FL    NRBC 0.0 0.0  WBC    ABSOLUTE NRBC 0.00 0.00 - 0.01 K/uL    NEUTROPHILS 72 40 - 73 %    BAND NEUTROPHILS 22 (H) 0 - 5 %    LYMPHOCYTES 3 (L) 21 - 52 %    MONOCYTES 3 3 - 10 %    EOSINOPHILS 0 0 - 5 %    BASOPHILS 0 0 - 2 %    IMMATURE GRANULOCYTES 0 %    ABS. NEUTROPHILS 7.0 1.8 - 8.0 K/UL    ABS. LYMPHOCYTES 0.2 (L) 0.9 - 3.6 K/UL    ABS. MONOCYTES 0.2 0.05 - 1.2 K/UL    ABS. EOSINOPHILS 0.0 0.0 - 0.4 K/UL    ABS. BASOPHILS 0.0 0.0 - 0.1 K/UL    ABS. IMM. GRANS. 0.0 K/UL    DF Manual      PLATELET COMMENTS Large Platelets      RBC COMMENTS Normocytic, Normochromic     MAGNESIUM    Collection Time: 09/01/22  2:15 AM   Result Value Ref Range    Magnesium 2.0 1.6 - 2.6 mg/dL   PTT    Collection Time: 09/01/22  2:15 AM   Result Value Ref Range    aPTT 70.4 (H) 23.0 - 36.4 sec    aPTT, therapeutic range   82 - 109 sec   LACTIC ACID    Collection Time: 09/01/22  2:15 AM   Result Value Ref Range    Lactic acid 2.1 (HH) 0.4 - 2.0 mmol/L   PTT    Collection Time: 09/01/22  9:45 AM   Result Value Ref Range    aPTT 106.6 (H) 23.0 - 36.4 sec    aPTT, therapeutic range   82 - 109 sec        Current Nutrition Intake & Therapies:  Average Meal Intake: NPO  Average Supplement Intake: NPO  DIET NPO    Anthropometric Measures:  Height: 5' 7\" (170.2 cm)  Ideal Body Weight (IBW): 148 lbs (67 kg)  Admission Body Weight: 161 lb  Current Body Wt:  73 kg (161 lb), 108.8 % IBW.  Bed scale  Current BMI (kg/m2): 25.2        Weight Adjustment: No adjustment BMI Category: Overweight (BMI 25.0-29. 9)    Estimated Daily Nutrient Needs:  Energy Requirements Based On: Kcal/kg (20-25 kcal/kg)  Weight Used for Energy Requirements: Admission (73 kg)  Energy (kcal/day): 5939-6206 kcal/day  Weight Used for Protein Requirements: Admission (1-1.2 g/kg)  Protein (g/day): 73-88 g/day  Method Used for Fluid Requirements: 1 ml/kcal  Fluid (ml/day): 1966-6960 mL/day    Nutrition Diagnosis:   Predicted inadequate energy intake related to cognitive or neurological impairment as evidenced by NPO or clear liquid status due to medical condition    Nutrition Interventions:   Food and/or Nutrient Delivery: Continue NPO  Nutrition Education/Counseling: Education not appropriate  Coordination of Nutrition Care: Continue to monitor while inpatient       Goals:     Goals: Initiate PO diet, by next RD assessment       Nutrition Monitoring and Evaluation:         Physical Signs/Symptoms Outcomes: Chewing or swallowing, Meal time behavior, Weight    Follow up 9/4/2022    Discharge Planning:     Too soon to determine    [address]: [place] [phone]

## 2022-09-01 NOTE — PROGRESS NOTES
Urine specimen obtained w/specimen collected and lab notified to . Sterile technique per protocol. Patient tolerated procedure well. Will continue to monitor.

## 2022-09-01 NOTE — PROGRESS NOTES
Progress Note    Patient: Sharan Small MRN: [id number]  SSN: [id number]    YOB: 1940  Age: 79 y.o. Sex: male      Admit Date: 8/30/2022    LOS: 1 day     Subjective:     Patient seen and examined. He is being followed for new diagnosis atrial fibrillation with RVR and elevated troponin. He has a history significant for  CVA, dementia, HTN,HLD, seizures, and carotid artery occlusion s/p TCAR on 8/15/22. He is positive for Covid on this admission as well. He is unable to provide meaningful conversation regarding his health. He appears to be resting comfortably when not stimulated. He is being treated for sepsis, Covid, and ABBY as well. Telemetry Review: atrial fibrillation; 's    Review of Symptoms:   Review of Systems   Unable to perform ROS: Dementia       Objective:     Vitals:    08/31/22 2332 09/01/22 0357 09/01/22 0604 09/01/22 0739   BP: (!) 176/81 (!) 158/89  (!) 173/80   Pulse: (!) 110 (!) 108  100   Resp: 20 21  17   Temp: 96.9 °F (36.1 °C) (!) 96.6 °F (35.9 °C)  (!) 96.7 °F (35.9 °C)   SpO2: 97% 98%     Weight:   73.1 kg (161 lb 3.2 oz)    Height:            Intake and Output:  Current Shift: No intake/output data recorded. Last three shifts: 08/30 1901 - 09/01 0700  In: 1300 [I.V.:1300]  Out: 250 [Urine:250]    Physical Exam:   Vital sign assessment reveal a blood pressure of 173/80 and pulse rate of 100. Cardiovascular exam has a heart with an irregularly irregular rhythm on telemetry and mildly elevated rate. Respiratory exam reveals he is on room air. Neurologic exam is notable for altered mental status.         Lab/Data Review:  BMP:   Lab Results   Component Value Date/Time     (H) 09/01/2022 02:15 AM    K 3.4 (L) 09/01/2022 02:15 AM     (H) 09/01/2022 02:15 AM    CO2 23 09/01/2022 02:15 AM    AGAP 6 09/01/2022 02:15 AM     (H) 09/01/2022 02:15 AM    BUN 24 (H) 09/01/2022 02:15 AM    CREA 0.80 09/01/2022 02:15 AM    GFRAA >60 09/01/2022 02:15 AM GFRNA >60 09/01/2022 02:15 AM            Current Facility-Administered Medications:     sodium chloride (NS) flush 5-40 mL, 5-40 mL, IntraVENous, Q8H, Alcon Cash NP, 10 mL at 08/31/22 0700    sodium chloride (NS) flush 5-40 mL, 5-40 mL, IntraVENous, PRN, Alcon Cash, NP    acetaminophen (TYLENOL) tablet 650 mg, 650 mg, Oral, Q6H PRN **OR** acetaminophen (TYLENOL) suppository 650 mg, 650 mg, Rectal, Q6H PRN, Alcon Cash, YOUNG, 650 mg at 08/31/22 1934    polyethylene glycol (MIRALAX) packet 17 g, 17 g, Oral, DAILY PRN, Alcon Cash NP    ondansetron (ZOFRAN ODT) tablet 4 mg, 4 mg, Oral, Q8H PRN **OR** ondansetron (ZOFRAN) injection 4 mg, 4 mg, IntraVENous, Q6H PRN, Alcon Cash NP    0.9% sodium chloride infusion, 100 mL/hr, IntraVENous, CONTINUOUS, Alcon Cash NP, Last Rate: 100 mL/hr at 09/01/22 0558, 100 mL/hr at 09/01/22 0558    [Held by provider] amLODIPine (NORVASC) tablet 10 mg, 10 mg, Oral, DAILY, Alcon Cash NP    [Held by provider] aspirin chewable tablet 81 mg, 81 mg, Oral, DAILY, Alcon Cash NP    [Held by provider] atorvastatin (LIPITOR) tablet 80 mg, 80 mg, Oral, QHS, Alcon Cash NP    [Held by provider] clopidogreL (PLAVIX) tablet 75 mg, 75 mg, Oral, DAILY, Alcon Cash NP    heparin 25,000 units in D5W 250 ml infusion, 18-36 Units/kg/hr, IntraVENous, TITRATE, Shelia Mane MD, Last Rate: 10.9 mL/hr at 08/31/22 2040, 15 Units/kg/hr at 08/31/22 2040    levETIRAcetam (KEPPRA) 1,000 mg in 0.9% sodium chloride 100 mL IVPB, 1,000 mg, IntraVENous, Q12H, Shelia Mane MD, Last Rate: 400 mL/hr at 09/01/22 0917, 1,000 mg at 09/01/22 0917    pantoprazole (PROTONIX) 40 mg in 0.9% sodium chloride 10 mL injection, 40 mg, IntraVENous, DAILY, Shelia Mane MD, 40 mg at 09/01/22 0850    hydrALAZINE (APRESOLINE) 20 mg/mL injection 20 mg, 20 mg, IntraVENous, Q6H PRN, Shelia Mane MD    LORazepam (ATIVAN) injection 2 mg, 2 mg, IntraVENous, PRN, Delroy Ferris MD    metoprolol (LOPRESSOR) injection 2.5 mg, 2.5 mg, IntraVENous, Q6H PRN, Cory SALAZAR NP      Assessment:     Active Problems:    UTI (urinary tract infection) (8/31/2022)      Altered mental status (8/31/2022)      COVID (8/31/2022)      Sepsis ([facility] [address] (8/31/2022)      Atrial fibrillation with RVR ([address] (8/31/2022)        Plan:     Case discussed with Collaborating physician Dr. Samara Velasquez and our recommendations are as follows:     Atrial fibrillation with RVR  New onset, rate in the 100s-110s  Recommend to use IV metoprolol or IV diltiazem until swallowing is assessed  He is on heparin drip  Keep serum potassium between 4-5 and serum magnesium > 2. Continue telemetry monitoring  Echo with EF 50-55%; study was limited due to patient agitation. HTN  Blood pressures are at goal  Monitor closely     Carotid occlusion with thrombus  S/p TCAR on 8/15/22  Was discharged from [facility] on aspirin, Brilinta, and statin  Aspirin and Brilinta are on hold until swallowing can be assessed; currently on heparin drip  If oral anticoagulation is used, will need to determine from his neurosurgeon which antiplatelet may be discontinued Era Contrersa MD)    Thank you for involving us in the care of this patient. Please do not hesitate to call if additional questions arise.  If after hours please call [phone]    Signed By: Michelle Moreau NP     September 1, 2022

## 2022-09-01 NOTE — PROGRESS NOTES
.Bedside shift change report given to AURY Ellis LPN (oncoming nurse) by DISHA Del Toro RN (offgoing nurse). Report included the following information SBAR, Kardex, Intake/Output, MAR, Recent Results, and Quality Measures.

## 2022-09-01 NOTE — PROGRESS NOTES
Hospitalist Progress Note    Subjective:   Daily Progress Note: 9/1/2022 1:20 PM    Hospital Course:    Pt examined and seen at bedside. Patient at this time is confused alert to name only, patient is agitated and yelling. Vital signs reviewed. Attending spoke with the patient wife in detail about the plan of care, at this time Mrs. Taiwo Ledbetter would like to continue with the plan of starting the patient on IV Vancomycin or the positive blood cultures, she would like no further testing performed and no invasive procedures. She would like comfort provided for the patient, but is not ready for comfort measure, which we did order small doses of Ativan and Morphine. Review of Systems    Unable to perform due to mentation.     Current Facility-Administered Medications   Medication Dose Route Frequency    morphine injection 1 mg  1 mg IntraVENous Q4H PRN    LORazepam (ATIVAN) injection 1 mg  1 mg IntraVENous Q6H PRN    vancomycin (VANCOCIN) 1,000 mg in 0.9% sodium chloride 250 mL ([id number])  1,000 mg IntraVENous Q8H    sodium chloride (NS) flush 5-40 mL  5-40 mL IntraVENous Q8H    sodium chloride (NS) flush 5-40 mL  5-40 mL IntraVENous PRN    acetaminophen (TYLENOL) tablet 650 mg  650 mg Oral Q6H PRN    Or    acetaminophen (TYLENOL) suppository 650 mg  650 mg Rectal Q6H PRN    polyethylene glycol (MIRALAX) packet 17 g  17 g Oral DAILY PRN    ondansetron (ZOFRAN ODT) tablet 4 mg  4 mg Oral Q8H PRN    Or    ondansetron (ZOFRAN) injection 4 mg  4 mg IntraVENous Q6H PRN    0.9% sodium chloride infusion  100 mL/hr IntraVENous CONTINUOUS    [Held by provider] amLODIPine (NORVASC) tablet 10 mg  10 mg Oral DAILY    [Held by provider] aspirin chewable tablet 81 mg  81 mg Oral DAILY    [Held by provider] atorvastatin (LIPITOR) tablet 80 mg  80 mg Oral QHS    [Held by provider] clopidogreL (PLAVIX) tablet 75 mg  75 mg Oral DAILY    heparin 25,000 units in D5W 250 ml infusion  18-36 Units/kg/hr IntraVENous TITRATE    levETIRAcetam (KEPPRA) 1,000 mg in 0.9% sodium chloride 100 mL IVPB  1,000 mg IntraVENous Q12H    pantoprazole (PROTONIX) 40 mg in 0.9% sodium chloride 10 mL injection  40 mg IntraVENous DAILY    hydrALAZINE (APRESOLINE) 20 mg/mL injection 20 mg  20 mg IntraVENous Q6H PRN    metoprolol (LOPRESSOR) injection 2.5 mg  2.5 mg IntraVENous Q6H PRN            Objective:     Visit Vitals  BP (!) 173/80   Pulse 100   Temp (!) 96.7 °F (35.9 °C)   Resp 17   Ht 5' 7\" (1.702 m)   Wt 73.1 kg (161 lb 3.2 oz)   SpO2 98%   BMI 25.25 kg/m²      O2 Device: None (Room air)    Temp (24hrs), Av °F (36.7 °C), Min:96.6 °F (35.9 °C), Max:102.7 °F (39.3 °C)      No intake/output data recorded.  1901 -  0700  In: 1300 [I.V.:1300]  Out: 250 [Urine:250]    PHYSICAL EXAM:    Constitutional: Confused, agitated, and yelling. HENT: Atraumatic, nose midline, oropharynx clear ad moist, trachea midline, no supraclavicular   Eyes: Conjunctiva normal and pupils equal   Skin: Dry, intact, warm, and dry   Cardiovascular: Tachycardia, normal heart sounds, no murmurs, discoloration to the left great toe (bluish), pulses to the left foot via dopplers   Respiratory: Lungs clear throughout, no wheezes, rales, or rhonchi, effort normal   Gastrointestinal: Appearance normal, bowel sounds are normal, bowl soft and non-tender   Genitourinary: Deferred   Musculoskeletal: Normal ROM   Neurological: Alert to name only, not following commands. Psychiatric: Agitated.        Data Review    Recent Results (from the past 24 hour(s))   POTASSIUM    Collection Time: 22  2:00 PM   Result Value Ref Range    Potassium 3.4 (L) 3.5 - 5.5 mmol/L   LACTIC ACID    Collection Time: 22  2:11 PM   Result Value Ref Range    Lactic acid 3.0 (HH) 0.4 - 2.0 mmol/L   PTT    Collection Time: 22  4:50 PM   Result Value Ref Range    aPTT 167.2 (HH) 23.0 - 36.4 sec    aPTT, therapeutic range   82 - 740 sec   METABOLIC PANEL, COMPREHENSIVE    Collection Time: 22 2:15 AM   Result Value Ref Range    Sodium 146 (H) 136 - 145 mmol/L    Potassium 3.4 (L) 3.5 - 5.5 mmol/L    Chloride 117 (H) 100 - 111 mmol/L    CO2 23 21 - 32 mmol/L    Anion gap 6 3.0 - 18.0 mmol/L    Glucose 106 (H) 74 - 99 mg/dL    BUN 24 (H) 7 - 18 mg/dL    Creatinine 0.80 0.60 - 1.30 mg/dL    BUN/Creatinine ratio 30 (H) 12 - 20      GFR est AA >60 >60 ml/min/1.73m2    GFR est non-AA >60 >60 ml/min/1.73m2    Calcium 7.7 (L) 8.5 - 10.1 mg/dL    Bilirubin, total 0.9 0.2 - 1.0 mg/dL    AST (SGOT) 91 (H) 10 - 38 U/L    ALT (SGPT) 62 (H) 16 - 61 U/L    Alk. phosphatase 63 45 - 117 U/L    Protein, total 5.6 (L) 6.4 - 8.2 g/dL    Albumin 2.5 (L) 3.4 - 5.0 g/dL    Globulin 3.1 2.0 - 4.0 g/dL    A-G Ratio 0.8 0.8 - 1.7     CBC WITH AUTOMATED DIFF    Collection Time: 09/01/22  2:15 AM   Result Value Ref Range    WBC 7.4 4.6 - 13.2 K/uL    RBC 3.43 (L) 4.35 - 5.65 M/uL    HGB 11.2 (L) 13.0 - 16.0 g/dL    HCT 32.4 (L) 36.0 - 48.0 %    MCV 94.5 78.0 - 100.0 FL    MCH 32.7 24.0 - 34.0 PG    MCHC 34.6 31.0 - 37.0 g/dL    RDW 13.9 11.6 - 14.5 %    PLATELET 994 (L) 024 - 420 K/uL    MPV 12.2 (H) 9.2 - 11.8 FL    NRBC 0.0 0.0  WBC    ABSOLUTE NRBC 0.00 0.00 - 0.01 K/uL    NEUTROPHILS 72 40 - 73 %    BAND NEUTROPHILS 22 (H) 0 - 5 %    LYMPHOCYTES 3 (L) 21 - 52 %    MONOCYTES 3 3 - 10 %    EOSINOPHILS 0 0 - 5 %    BASOPHILS 0 0 - 2 %    IMMATURE GRANULOCYTES 0 %    ABS. NEUTROPHILS 7.0 1.8 - 8.0 K/UL    ABS. LYMPHOCYTES 0.2 (L) 0.9 - 3.6 K/UL    ABS. MONOCYTES 0.2 0.05 - 1.2 K/UL    ABS. EOSINOPHILS 0.0 0.0 - 0.4 K/UL    ABS. BASOPHILS 0.0 0.0 - 0.1 K/UL    ABS. IMM.  GRANS. 0.0 K/UL    DF Manual      PLATELET COMMENTS Large Platelets      RBC COMMENTS Normocytic, Normochromic     MAGNESIUM    Collection Time: 09/01/22  2:15 AM   Result Value Ref Range    Magnesium 2.0 1.6 - 2.6 mg/dL   PTT    Collection Time: 09/01/22  2:15 AM   Result Value Ref Range    aPTT 70.4 (H) 23.0 - 36.4 sec    aPTT, therapeutic range   82 - 109 sec   LACTIC ACID    Collection Time: 09/01/22  2:15 AM   Result Value Ref Range    Lactic acid 2.1 (HH) 0.4 - 2.0 mmol/L   PTT    Collection Time: 09/01/22  9:45 AM   Result Value Ref Range    aPTT 106.6 (H) 23.0 - 36.4 sec    aPTT, therapeutic range   82 - 109 sec       XR CHEST SNGL V   Final Result      New right arm PICC in good position, catheter tip SVC. XR CHEST PORT   Final Result      No acute findings in the chest.       CT HEAD WO CONT   Final Result      1. No acute intracranial abnormalities are identified. No CT evidence to   suggest acute intracranial hematoma, cortical infarct, or mass effect/mass   lesion. No significant change. 2. Moderate diffuse volume loss with moderate white matter hypodensity likely   chronic microvascular ischemic disease. CT SPINE CERV WO CONT   Final Result      1. No acute osseous findings. 2. Multilevel degenerative spondylosis, with moderate to severe canal stenosis   and cord flattening C5/6. Bilateral multilevel degenerative foraminal stenosis. XR CHEST PORT   Final Result      1.  Negative chest.          Active Problems:    UTI (urinary tract infection) (8/31/2022)      Altered mental status (8/31/2022)      COVID (8/31/2022)      Sepsis ([address] (8/31/2022)      Atrial fibrillation with RVR ([address] (8/31/2022)        Assessment/Plan:     Sepsis   -unknown origin, blood culture growing gram positive cocci in cluster, possible contamination, patient started on Vancomycin per pharmacy dosing  -patient remains confused and agitated  -CT head: No acute intracranial abnormalities are identified  -lactic acid 3.0 on admission trending down  -there is no leukocytosis, patient was febrile previous shift  -continue IVF while NPO (speech therapist consulted, awaiting for mentation improve before evaluation can be done  -CBC in the am  -urine culture pending    Acute Encephalopathy  -likely secondary to infectious source, blood cultures positive, patient started on IV Vancomycin  -CT head: negative for any acute findings  -patient remains altered with agitations, small doses of Ativan and Morphine PRN ordered   -mittens in place to protect lines    Covid Infection  -confirmed on 8/31  -CXR negative for any acute findings  -stable on room air  -Tylenol PRN for fever  -encourage proning as tolerated  -Isolation precautions maintained  -patient with blue toe possible from covid, pulses are palpable via doppler, spoke with the wife, and she rather not have the PVL performed, she stated it would change his course of treatment he is already on a Heparin gtt and should would not like to have any further invasive procedure performed     Atrial fibrillation with RVR ([address]  -rated controlled  -continue PRN Lopressor per cardiologist recommendations  -patient continues on a Heparin Gtt with a therapeutic APTT  -appreciated cardiology consult     Hypertension  -chronic, slight elevated, which may be secondary to agitation  -monitor BP closely     Carotid occlusion with thrombus  -S/p TCAR on 8/15/22  -holding statin and Brilinta for now due to NPO status    ABBY  -resolved with IVF    DVT Prophylaxis: Heparin gtt    Code Status: DNR/DNI    POA: Chepe Persaud, attending spoke with this spouse in a lengthy conversation about the plan of care    Total time spent with patient: >35 minutes.

## 2022-09-01 NOTE — PROGRESS NOTES
Hollering and moaning at frequent intervals. Patient unable to participate in verbal pain scale w/use of non-verbal pain scale instead. Morphine 1mg administered IVP. Will monitor results. Droplet (+) isolation continues.

## 2022-09-02 NOTE — PROGRESS NOTES
.Bedside shift change report given to AURY Duncan RN (oncoming nurse) by Nanci Mitchell LPN (offgoing nurse). Report included the following information SBAR, Kardex, Intake/Output, MAR, Recent Results, and Quality Measures.

## 2022-09-02 NOTE — PROGRESS NOTES
Progress Note    Patient: Fitz Medrano MRN: [id number]  SSN: [id number]    YOB: 1940  Age: 79 y.o. Sex: male      Admit Date: 8/30/2022    LOS: 2 days     Subjective:     Patient seen and examined. Fitz Medrano is being followed for new diagnosis atrial fibrillation with RVR and elevated troponin. He has a history significant for  CVA, dementia, HTN,HLD, seizures, and carotid artery occlusion s/p TCAR on 8/15/22. He is being treated for sepsis, Covid, and ABBY. Patient moans/grunts incomprehensibly during visit with no attempts at communication, does not follow commands. Telemetry Review: atrial fibrillation; accelerated rates at times with agitation, 3 beat NSVT overnight, no further arrhythmia noted    Review of Symptoms:   Review of Systems   Unable to perform ROS: Dementia       Objective:     Vitals:    09/02/22 0607 09/02/22 0815 09/02/22 1133 09/02/22 1205   BP:  (!) 142/75 (!) 163/63 (!) 163/63   Pulse:  98 (!) 116 (!) 134   Resp:  16  23   Temp:  98 °F (36.7 °C)  97.1 °F (36.2 °C)   SpO2:  100%  95%   Weight: 77.4 kg (170 lb 11.2 oz)      Height:            Intake and Output:  Current Shift: 09/02 0701 - 09/02 1900  In: 581.1 [I.V.:581.1]  Out: -   Last three shifts: 08/31 1901 - 09/02 0700  In: 1565 [I.V.:1565]  Out: -     Physical Exam:   General: Well nourished chronically ill appearing male lying in bed groaning incomprehensibly  HEENT: Normocephalic  Neck: Supple, Trachea midline, mild JVD  RESP: +Expiratory Wheeze and Rhonchi upper airways, bases diminished. No distress. On RA  Cardiovascular: irregularly irregular rhythm, accelerated rate,   PVS: No rubor, cyanosis, no edema, Radial, DP, PT pulses equal bilaterally +1  ABD: obese , Normoactive BS, no grimacing/guarding noted with palpation  Derm: Warm/Dry, poor turgor  Neuro: Groans/moans. Does not follow commands. Does not attempt verbal communication or eye contact.    PSYCH: + agitation         Lab/Data Review:  BMP: Lab Results   Component Value Date/Time     (H) 09/02/2022 04:00 AM    K 3.1 (L) 09/02/2022 04:00 AM     (H) 09/02/2022 04:00 AM    CO2 22 09/02/2022 04:00 AM    AGAP 6 09/02/2022 04:00 AM     (H) 09/02/2022 04:00 AM    BUN 24 (H) 09/02/2022 04:00 AM    CREA 0.77 09/02/2022 04:00 AM    GFRAA >60 09/02/2022 04:00 AM    GFRNA >60 09/02/2022 04:00 AM            Current Facility-Administered Medications:     morphine injection 2 mg, 2 mg, IntraVENous, Q2H PRN, [place], Bibiana S, ACNP    LORazepam (ATIVAN) injection 2 mg, 2 mg, IntraVENous, Q4H PRN, Richy, Bibiana S, ACNP    sodium chloride (NS) flush 5-40 mL, 5-40 mL, IntraVENous, Q8H, Mai Juan Francisco, NP, 10 mL at 09/02/22 0511    acetaminophen (TYLENOL) tablet 650 mg, 650 mg, Oral, Q6H PRN **OR** acetaminophen (TYLENOL) suppository 650 mg, 650 mg, Rectal, Q6H PRN, Mai Juan Francisco, NP, 650 mg at 08/31/22 1934      Assessment:     Active Problems:    UTI (urinary tract infection) (8/31/2022)      Altered mental status (8/31/2022)      COVID (8/31/2022)      Sepsis ([facility] [address] (8/31/2022)      Atrial fibrillation with RVR ([facility] [address] (8/31/2022)      Plan:     Case discussed with Collaborating physician Dr. Raman Ferreira and our recommendations are as follows:     Atrial fibrillation with RVR - new onset. Echo with EF 50-55% and both atria normal in size, however study was limited due to patient agitation. Still unable to take PO. Rates <120 unless patient agitated and he responds well to prn Ativan. Has prn Metoprolol IVP as well if rates sustained >120. Given I have not seen this would leave prn and not schedule at this time. [id number]: 6. Continue Heparin gtt per protocol for stroke risk reduction, unless family decides to pursue Comfort care. Keep serum potassium between 4-5 and serum magnesium > 2. Continue telemetry monitoring   HTN: Labile s/t agitation. Continue prn medications as indicated given inability to take PO. Monitor closely. Hypokalemia: replete to goal 4-5. Continue telemetry monitor. Labs in am. Monitor Magnesium levels as well and replete prn to goal >2. Right Carotid occlusion with thrombus: S/p TCAR on 8/15/22. Was discharged from [facility] on aspirin, Brilinta, and statin with recommendation of Brlinta x1 months then ASA/Statin Lifelong. Aspirin and Brilinta are on hold currently s/t NPO and pt currently on heparin drip. If oral anticoagulation is used, will need to speak with Neurosurgeon Meagan MD Radha) in regards to potential Triple Therapy through 9/15/22 and his preference. Thank you for involving us in the care of this patient. Please do not hesitate to call if additional questions arise.  If after hours please call [phone]    Signed By: Haylee Foss NP     September 2, 2022

## 2022-09-02 NOTE — PROGRESS NOTES
I was asked to reach out to wife to answer hospice related questions/payment r/t insurance. Wife called and POC discussed with wife regarding pts status and hospice/comfort questions answered and will then discuss POC with hospitalist group. POC is comfort measures with a disposition to potentially hospice care.

## 2022-09-02 NOTE — PROGRESS NOTES
@4083 Received care of pt from off going nurse. Pt remains on droplet precautions. Heparin drip @ 15 units/kg/hr. @5122 PM Assessment completed. Pt anxious and restless at present. Bilateral mittens and posey vest intact. Pt resp tachypnea at present. Lung sounds clear in upper lobes but diminished in bases. SATS 93-95% on RA. Skin warm and dry. Double lumen PICC intact, Air circumference 29 cm, 5 cm from hub insertion site. Generalized bruising noted to abdomen and old abrasion to forehead. @2018 Ativan 0.5 mg IV given for anxiety. @0114 Pt moaning out. 's, resp labored. Morphine 0.5 mg IV given. @1204  into draw am labs. This nurse terrance am from PICC line. Ports changed and flushed per protocol. @0591 APTT 81.1, therapeutic. No Change, Heparin drip remains 2 15 units/kg/hr. @9992 Bedside shift report given to oncoming nurse.

## 2022-09-02 NOTE — PROGRESS NOTES
Ativan 2mg administered IVP for increased restlessness. Will monitor results. Safety measures remain in place.

## 2022-09-02 NOTE — PROGRESS NOTES
Problem: Pressure Injury - Risk of  Goal: *Prevention of pressure injury  Description: Document Yony Scale and appropriate interventions in the flowsheet. Outcome: Progressing Towards Goal  Note: Pressure Injury Interventions:  Sensory Interventions: Assess changes in LOC, Minimize linen layers    Moisture Interventions: Minimize layers    Activity Interventions: Pressure redistribution bed/mattress(bed type)    Mobility Interventions: PT/OT evaluation    Nutrition Interventions: Document food/fluid/supplement intake    Friction and Shear Interventions: Minimize layers                Problem: Falls - Risk of  Goal: *Absence of Falls  Description: Document Yang Fall Risk and appropriate interventions in the flowsheet.   Outcome: Progressing Towards Goal  Note: Fall Risk Interventions:       Mentation Interventions: Bed/chair exit alarm, Room close to nurse's station, Reorient patient    Medication Interventions: Bed/chair exit alarm    Elimination Interventions: Call light in reach, Bed/chair exit alarm    History of Falls Interventions: Bed/chair exit alarm         Problem: Pain  Goal: *Control of Pain  Outcome: Progressing Towards Goal  Goal: *PALLIATIVE CARE:  Alleviation of Pain  Outcome: Progressing Towards Goal     Problem: General Medical Care Plan  Goal: *Skin integrity maintained  Outcome: Progressing Towards Goal  Goal: *Fluid volume balance  Outcome: Progressing Towards Goal     Problem: Altered Thought Process (Adult/Pediatric)  Goal: *STG: Remains safe in hospital  Outcome: Progressing Towards Goal     Problem: Non-Violent Restraints  Goal: No harm/injury to patient while restraints in use  Outcome: Progressing Towards Goal  Goal: Patient's dignity will be maintained  Outcome: Progressing Towards Goal     Problem: Airway Clearance - Ineffective  Goal: Achieve or maintain patent airway  Outcome: Progressing Towards Goal     Problem: Gas Exchange - Impaired  Goal: Absence of hypoxia  Outcome: Progressing Towards Goal     Problem: Body Temperature -  Risk of, Imbalanced  Goal: Ability to maintain a body temperature within defined limits  Outcome: Progressing Towards Goal     Problem: Isolation Precautions - Risk of Spread of Infection  Goal: Prevent transmission of infectious organism to others  Outcome: Progressing Towards Goal     Problem: Risk for Fluid Volume Deficit  Goal: Maintain normal heart rhythm  Outcome: Progressing Towards Goal     Problem: General Medical Care Plan  Goal: *Vital signs within specified parameters  Outcome: Not Progressing Towards Goal  Goal: *Labs within defined limits  Outcome: Not Progressing Towards Goal  Goal: *Optimize nutritional status  Outcome: Not Progressing Towards Goal  Goal: *Anxiety reduced or absent  Outcome: Not Progressing Towards Goal  Goal: *Progressive mobility and function (eg: ADL's)  Outcome: Not Progressing Towards Goal     Problem: Altered Thought Process (Adult/Pediatric)  Goal: *STG: Participates in treatment plan  Outcome: Not Progressing Towards Goal  Goal: *STG: Seeks staff when feelings of anxiety and fear arise  Outcome: Not Progressing Towards Goal     Problem: Non-Violent Restraints  Goal: Removal from restraints as soon as assessed to be safe  Outcome: Not Progressing Towards Goal  Goal: Patient Interventions  Outcome: Not Progressing Towards Goal     Problem: Gas Exchange - Impaired  Goal: Promote optimal lung function  Outcome: Not Progressing Towards Goal     Problem: Breathing Pattern - Ineffective  Goal: Ability to achieve and maintain a regular respiratory rate  Outcome: Not Progressing Towards Goal     Problem:  Body Temperature -  Risk of, Imbalanced  Goal: Will regain or maintain usual level of consciousness  Outcome: Not Progressing Towards Goal     Problem: Nutrition Deficits  Goal: Optimize nutrtional status  Outcome: Not Progressing Towards Goal     Problem: Loneliness or Risk for Loneliness  Goal: Demonstrate positive use of time alone when socialization is not possible  Outcome: Not Progressing Towards Goal     Problem: Fatigue  Goal: Verbalize increase energy and improved vitality  Outcome: Not Progressing Towards Goal     Problem: Nutrition Deficit  Goal: *Optimize nutritional status  Outcome: Not Progressing Towards Goal

## 2022-09-02 NOTE — PROGRESS NOTES
Patient was readmitted to [facility] on 8/30/22. Resolving current Transitions of Care episode due to admission status.

## 2022-09-02 NOTE — PROGRESS NOTES
Comfort care continues. Ativan and Morphine administrations have been effective. T&P at frequent intervals for comfort and pressure relief. Oral care performed during T&P. Will continue to monitor.

## 2022-09-02 NOTE — PROGRESS NOTES
Rounding, assessment, VS and white board completed. Droplet (+) precautions in place. Restraint protocol ongoing. IVF and Heparin ongoing via RUE double lumen PICC. T&P for comfort and pressure relief. Oral care provided w/thick secretions cleared/suctioned from mouth. Will continue to monitor. Safety measures in place.

## 2022-09-02 NOTE — PROGRESS NOTES
Discussed POC with pts wife. Wife very anxious and hard for her to follow conversation at times. Asking about skilled days at [facility] and if [facility] MD needs to DC pt at our hospital.  Explained to wife the hospitalist here will see pt and [facility] will be contacted to see if pt would still have a bed at DC per wifes request.  [facility] states he would still have a bed, attempted to call wife back but no answer.

## 2022-09-02 NOTE — PROGRESS NOTES
Rounding and medication passes continue. T&P for comfort and pressure relief. Oral care provided. Will continue to monitor. Restraint protocol ongoing.

## 2022-09-02 NOTE — PROGRESS NOTES
Hospitalist Progress Note    Daily Progress Note: 2022 12:54 PM      Sharan Small                                            MRN: [id number]                                  :1940      Subjective:     Pt examined and seen at bedside. Patient is agitated flailing in the bed trying to removed his mittens. Patient is unable to participate in the ROS due to his mentation. This writer along with the attending provider Dr Sal Silva did speak to Mrs. Orquidea Horton to update her on Mr. Page prognosis. She was made aware that the patient is not improving, at this time she has decided to make the patient comfort measures. Case management consulted for possible discharge to SNF with hospice care. No acute events reported overnight. Objective:     Visit Vitals  BP (!) 163/63 (BP 1 Location: Left upper arm, BP Patient Position: At rest)   Pulse (!) 134   Temp 97.1 °F (36.2 °C)   Resp 23   Ht 5' 7\" (1.702 m)   Wt 77.4 kg (170 lb 11.2 oz)   SpO2 95%   BMI 26.74 kg/m²      O2 Device: None (Room air)    Temp (24hrs), Av °F (36.7 °C), Min:97 °F (36.1 °C), Max:99.8 °F (37.7 °C)      701 -  1900  In: 581.1 [I.V.:581.1]  Out: -    190 -  0700  In: 1565 [I.V.:1565]  Out: -     PHYSICAL EXAM:  Physical Exam  Vitals and nursing note reviewed. Constitutional:       Appearance: Normal appearance. HENT:      Head: Normocephalic and atraumatic. Mouth/Throat:      Mouth: Mucous membranes are dry. Eyes:      Extraocular Movements: Extraocular movements intact. Pupils: Pupils are equal, round, and reactive to light. Cardiovascular:      Rate and Rhythm: Atrial Fibrillation, rate uncontrolled      Pulses: Normal pulses. Heart sounds: Normal heart sounds. Pulmonary:      Effort: Pulmonary effort is normal.      Breath sounds: Normal breath sounds. Abdominal:      General: Abdomen is flat. Bowel sounds are normal.      Palpations: Abdomen is soft.    Musculoskeletal:      Cervical back: Normal range of motion and neck supple. Skin:     General: Skin is warm and dry. Capillary Refill: Capillary refill takes less than 2 seconds. Neurological:      General: No focal deficit present. Mental Status: Patient waxing and waning between sedation and agitation, at this time he is severely agitated.    Psychiatric:         Mood and Affect: Agitated     Current Facility-Administered Medications   Medication Dose Route Frequency    potassium chloride 10 mEq in 100 ml IVPB  10 mEq IntraVENous Q1H    dextrose 5% infusion  75 mL/hr IntraVENous CONTINUOUS    LORazepam (ATIVAN) injection 0.5 mg  0.5 mg IntraVENous Q6H PRN    morphine injection 0.5 mg  0.5 mg IntraVENous Q4H PRN    VANCOMYCIN INFORMATION NOTE   Other Rx Dosing/Monitoring    vancomycin (VANCOCIN) 750 mg in 0.9% sodium chloride 250 mL ([id number])  750 mg IntraVENous Q12H    [START ON 9/3/2022] Vancomycin level to be drawn 9/3 at 0230 before 0300 dose   Other ONCE    sodium chloride (NS) flush 5-40 mL  5-40 mL IntraVENous Q8H    sodium chloride (NS) flush 5-40 mL  5-40 mL IntraVENous PRN    acetaminophen (TYLENOL) tablet 650 mg  650 mg Oral Q6H PRN    Or    acetaminophen (TYLENOL) suppository 650 mg  650 mg Rectal Q6H PRN    polyethylene glycol (MIRALAX) packet 17 g  17 g Oral DAILY PRN    ondansetron (ZOFRAN ODT) tablet 4 mg  4 mg Oral Q8H PRN    Or    ondansetron (ZOFRAN) injection 4 mg  4 mg IntraVENous Q6H PRN    [Held by provider] amLODIPine (NORVASC) tablet 10 mg  10 mg Oral DAILY    [Held by provider] aspirin chewable tablet 81 mg  81 mg Oral DAILY    [Held by provider] atorvastatin (LIPITOR) tablet 80 mg  80 mg Oral QHS    [Held by provider] clopidogreL (PLAVIX) tablet 75 mg  75 mg Oral DAILY    heparin 25,000 units in D5W 250 ml infusion  18-36 Units/kg/hr IntraVENous TITRATE    levETIRAcetam (KEPPRA) 1,000 mg in 0.9% sodium chloride 100 mL IVPB  1,000 mg IntraVENous Q12H    pantoprazole (PROTONIX) 40 mg in 0.9% sodium chloride 10 mL injection  40 mg IntraVENous DAILY    hydrALAZINE (APRESOLINE) 20 mg/mL injection 20 mg  20 mg IntraVENous Q6H PRN    metoprolol (LOPRESSOR) injection 2.5 mg  2.5 mg IntraVENous Q6H PRN        Assessment/Plan:     There has been no improvement in the patient health status, patient remains agitated and pulling at lines and tubing, after a lengthy conversations with Mrs. Karlee Bond the patient's spouse with multiple healthcare providers, she has decided to make the patient comfort measure only, with hospice at discharge.      Sepsis   -unknown origin, blood culture growing gram positive cocci in cluster, possible contamination, patient started on Vancomycin, but now has been discontinued after comfort measure status   -patient remains confused and agitated  -CT head: No acute intracranial abnormalities are identified  -lactic acid 3.0 on admission trending down     Acute Encephalopathy  -likely secondary to infectious source, blood cultures positive, patient started on IV Vancomycin, now discontinued   -CT head: negative for any acute findings  -waxing and waning, patient is either sedated or agitated, will continue Morphine and Ativan for now  -mittens removed    Hypokalemia  -potassium 3.1, replaced with IV potassium x 4    Covid Infection  -confirmed on 8/31  -CXR negative for any acute findings  -stable on room air  -Tylenol PRN for fever  -encourage proning as tolerated  -Isolation precautions maintained  -patient with blue toe possible from covid, pulses are palpable via doppler, spoke with the wife, a    Atrial fibrillation with RVR ([address]  -rate controlled, becomes uncontrolled with agitation, Heparin gtt stopped, discontinued cardiac monitoring     Hypertension  -chronic, slight elevated, which may be secondary to agitation    Carotid occlusion with thrombus  -S/p TCAR on 8/15/22    ABBY  -resolved with IVF    Disposition    Disposition: TBD     Code Status: Comfort Measure only    Care Plan discussed with: Patient's spouse and nursing    Clinical time 25 minutes with >50% of visit spent in counseling and coordination of care    Signed by: CORY Gibson-BC 9/2/2022

## 2022-09-02 NOTE — PROGRESS NOTES
Rounding and medication passes completed. [facility] in progress. Repositioned for comfort and pressure relief. Will continue to monitor.

## 2022-09-03 NOTE — PROGRESS NOTES
2240 - Patient noted to have no visible chest rise or palpable pulse. Attending hospitalist, ELBERT Saldivar PA-C notified and promptly on unit to pronounce death at [phone].    36 - Patient's spouse, Traci Fowler, called at [phone] and informed of patient's time of death. Family elects to use [facility]. [facility] notified of patient's death and location at our facility. Informed that they will be the way to  body.

## 2022-09-03 NOTE — DEATH NOTE
Patient was evaluated today with worsening prognosis. Plan made comfort measures after being advised by attending physician of worsening prognosis. Only admitted for that and subsequent sepsis which is cause of death. Patient was in the ICU and noticed to have no respiratory response at approximately 2040 hrs. Since status was confirmed as that 2042 hrs. Appropriate next of kin notifications were made and death was from natural causes noted to be sepsis and secondary COVID infection.

## 2022-09-03 NOTE — PROGRESS NOTES
@1184 Received care of pt from off going nurse. @2002 PM Assessment completed. Pt responds to painful stimuli. Pupils 2mm, sluggish. Resp tachypnea. Lung sounds coarse rhonchi in upper lobes but diminished in bases. SATS 86% on RA. 3 lpm via n/c applied. SATS 92-94%. Oral care done. Pt moans out at intervals. Pt turned and repositioned for comfort. Pt remains on comfort care. @2017 Morphine 4 mg IV given. @2067 Post-mortem care done. [address] [place] [address] Drive into collect pt body.

## 2022-09-06 LAB
BACTERIA SPEC CULT: ABNORMAL
SPECIAL REQUESTS,SREQ: ABNORMAL

## 2022-09-06 NOTE — PROGRESS NOTES
Physician Progress Note      Rickey Rg  CSN #:                  [id number]  :                       1940  ADMIT DATE:       2022 4:26 PM  Kathleen Ken DATE:        2022 10:40 PM  RESPONDING  PROVIDER #:        Noris RAY          QUERY TEXT:    Patient admitted with Sepsis noted to have atrial fibrillation and was placed on heparin gtt. If possible, please document in progress notes and discharge summary if you are evaluating and/or treating any of the following: The medical record reflects the following:  Risk Factors: h/o CVA, HTN, seizure, dementia,  recent AUDRA symptomatic acute occlusion then severe stenosis with ischemia  Clinical Indicators: 80yo male presented with Sepsis, UTI and Covid. Also noted to have A. fib. : Hospitalist note: discoloration to the left great toe (bluish), pulses to the left foot via dopplers   Cardiology note: Still unable to take PO.  [id number] = 6. Continue Heparin gtt per protocol for stroke risk reduction, unless family decides to pursue Comfort care    Treatment: heparin gtt, comfort measures    Alisa Fernandez RN, BSN, [address] [place] [address]  [phone]  Options provided:  -- Secondary hypercoagulable state in a patient with atrial fibrillation  -- Other - I will add my own diagnosis  -- Disagree - Not applicable / Not valid  -- Disagree - Clinically unable to determine / Unknown  -- Refer to Clinical Documentation Reviewer    PROVIDER RESPONSE TEXT:    This patient has secondary hypercoagulable state related to atrial fibrillation. Query created by: Brent Morrison on 2022 5:36 PM      QUERY TEXT:    Pt admitted with COVId 23, UTI . Pt noted to have Sepsis and acute organ dysfunction of ABBY and metabolic encephalopathy. If possible, please document in progress notes and discharge summary the relationship, if any, between Sepsis and  ABBY and metabolic encephalopathy.     The medical record reflects the following:  Risk Factors: 78yo male, recent AUDRA stenosis with stent placement  Clinical Indicators: Patient presented with AMS, found to have COVID 19 +, UTI,  VS on admission T 98.3, P 119, RR 18, BP 98/40  8/31 @ 0325 VS= T 101.7 P 102 RR 19 /49  Labs 8/30 @ 1700: WBC 9.6, 92% neuts, 4% bands  Bun/creat 20/1.42  8/31 @ 0520 WBC 9.6, 82% neuts, 17% bands  Bun/creat 26/1.41  H&P: Sepsis, possible urosepsis, COVID, AMS possibly reltated to UTI and/or COVID,  ABBY  9/1: Acute encephalopathy likely secondary to infectious source, blood cultures positive,  Treatment: Monitor labs, IV abx, monitor mental status, comfort care    Levar Jensen RN, BSN, [address] [place] [address]  [phone]  Options provided:  -- Acute organ dysfunction of  ABBY and metabolic encephalopathy is associated with sepsis  -- Acute organ dysfunction of  ABBY and metabolic encephalopathy is unrelated to sepsis  -- Other - I will add my own diagnosis  -- Disagree - Not applicable / Not valid  -- Disagree - Clinically unable to determine / Unknown  -- Refer to Clinical Documentation Reviewer    PROVIDER RESPONSE TEXT:    This patient has acute organ dysfunction of ABBY and metabolic encephalopathy associated with sepsis.     Query created by: Satish Adam on 9/6/2022 5:52 PM      Electronically signed by:  Amy RAY 9/6/2022 6:05 PM

## 2022-09-14 NOTE — DISCHARGE SUMMARY
Discharge Summary       PATIENT ID: Carlos Cedeño  MRN: [id number]   YOB: 1940    DATE OF ADMISSION: 8/30/2022  4:26 PM    DATE OF DISCHARGE: 09/02/22    PRIMARY CARE PROVIDER: Zacarias Zepeda MD     ATTENDING PHYSICIAN: Spencer Sams MD  DISCHARGING PROVIDER: Spencer Sams MD        CONSULTATIONS: IP CONSULT TO CARDIOLOGY    PROCEDURES/SURGERIES: Procedure(s):  PICC placement    [address] COURSE:   Carlos Cedeño is a 79 y.o. male  has a past medical history of Dementia ([facility] (08/12/2022), Dyslipidemia (12/30/2020), Hypertension, AUDRA symptomatic acute occlusion then severe stenosis with ischemia but no infarct (08/09/2022), and Seizure ([facility] [address] (08/09/2022). Patient a resident of [facility]. Recently tested positive for COVID. Transported to the ED via EMS after nurse attempted to get vital signs and couldn't obtain a blood pressure. Prior to getting vital signs patient was found down in floor. Upon arrival to ED, patient stable. BP 98/40, . Patient confused, which wife stated is not his baseline. CBC not concerning. Noted to have some mild hypokalemia, ABBY. Lactic acid 3.7, troponin 150. Ekg sinus tach in ED. CT of head and Spine negative. CXR negative. UA with small amount of blood and 2+ bacteria. Received Ceftriaxone IV in ED. Admitted to telemetry. Patient examined at bedside. Unable to answer questions appropriately. Restless, attempting to get out of bed. HR noted to intermittently increase to the 160s even when resting. DISCHARGE DIAGNOSES / PLAN:      Assessment/Plan:      There has been no improvement in the patient health status, patient remains agitated and pulling at lines and tubing, after a lengthy conversations with Mrs. Linares Police the patient's spouse with multiple healthcare providers, she has decided to make the patient comfort measure only, with hospice at discharge.       Sepsis   -unknown origin, blood culture growing gram positive cocci in cluster, possible contamination, patient started on Vancomycin, but now has been discontinued after comfort measure status   -patient remains confused and agitated  -CT head: No acute intracranial abnormalities are identified  -lactic acid 3.0 on admission trending down     Acute Encephalopathy  -likely secondary to infectious source, blood cultures positive, patient started on IV Vancomycin, now discontinued   -CT head: negative for any acute findings  -waxing and waning, patient is either sedated or agitated, will continue Morphine and Ativan for now  -mittens removed     Hypokalemia  -potassium 3.1, replaced with IV potassium x 4     Covid Infection  -confirmed on 8/31  -CXR negative for any acute findings  -stable on room air  -Tylenol PRN for fever  -encourage proning as tolerated  -Isolation precautions maintained  -patient with blue toe possible from covid, pulses are palpable via doppler, spoke with the wife, a     Atrial fibrillation with RVR ([facility] [address]  -rate controlled, becomes uncontrolled with agitation, Heparin gtt stopped, discontinued cardiac monitoring     Hypertension  -chronic, slight elevated, which may be secondary to agitation     Carotid occlusion with thrombus  -S/p TCAR on 8/15/22     ABBY  -resolved with IVF      Pt converted to comfort care on 9/2 and passed comfortably later in day.    Cause of death- Staph Aureus Septicemia and COVID 19            CHRONIC MEDICAL DIAGNOSES:  Problem List as of 9/2/2022 Date Reviewed: 12/31/2020            Codes Class Noted - Resolved    UTI (urinary tract infection) ICD-10-CM: N39.0  ICD-9-CM: 599.0  8/31/2022 - Present        Altered mental status ICD-10-CM: R41.82  ICD-9-CM: 780.97  8/31/2022 - Present        COVID ICD-10-CM: U07.1  ICD-9-CM: 079.89  8/31/2022 - Present        Sepsis ([facility] [address] ICD-10-CM: A41.9  ICD-9-CM: 038.9, 995.91  8/31/2022 - Present        Atrial fibrillation with RVR ([facility] [address] ICD-10-CM: I48.91  ICD-9-CM: 427.31  8/31/2022 - Present        Dementia ([facility] ICD-10-CM: F03.90  ICD-9-CM: 294.20  8/12/2022 - Present        Acute respiratory failure with hypoxia [facility]) ICD-10-CM: J96.01  ICD-9-CM: 518.81  8/9/2022 - Present        Seizure ([facility] ICD-10-CM: R56.9  ICD-9-CM: 780.39  8/9/2022 - Present        AUDRA symptomatic acute occlusion then severe stenosis with ischemia but no infarct ICD-10-CM: I65.21  ICD-9-CM: 433.10  8/9/2022 - Present        Aspiration pneumonia ([facility] ICD-10-CM: J69.0  ICD-9-CM: 507.0  8/9/2022 - Present        Peripheral vascular disease ([facility] ICD-10-CM: I73.9  ICD-9-CM: 443.9  12/31/2020 - Present    Overview Signed 12/31/2020  9:09 AM by Joanie Kevin MD     blockage right leg artery cleaned out 2/2019             Encounter for screening colonoscopy ICD-10-CM: Z12.11  ICD-9-CM: V76.51  12/31/2020 - Present    Overview Signed 12/31/2020  9:13 AM by Joanie Kevin MD     colonoscopy normal 2/14/2020             Abnormal gait ICD-10-CM: R26.9  ICD-9-CM: 781.2  12/31/2020 - Present        Benign prostatic hyperplasia with lower urinary tract symptoms ICD-10-CM: N40.1  ICD-9-CM: 600.01  12/31/2020 - Present        Essential hypertension ICD-10-CM: I10  ICD-9-CM: 401.9  12/30/2020 - Present        Dyslipidemia ICD-10-CM: E78.5  ICD-9-CM: 272.4  12/30/2020 - Present               Signed:   Terri Gonzalez MD  9/14/2022  2:18 PM

## 2022-10-03 DIAGNOSIS — I10 ESSENTIAL HYPERTENSION: ICD-10-CM

## 2022-10-03 RX ORDER — AMLODIPINE BESYLATE 10 MG/1
TABLET ORAL
Qty: 90 TABLET | Refills: 0 | OUTPATIENT
Start: 2022-10-03
